# Patient Record
Sex: MALE | Race: WHITE | NOT HISPANIC OR LATINO | Employment: FULL TIME | URBAN - METROPOLITAN AREA
[De-identification: names, ages, dates, MRNs, and addresses within clinical notes are randomized per-mention and may not be internally consistent; named-entity substitution may affect disease eponyms.]

---

## 2017-09-13 ENCOUNTER — OFFICE VISIT (OUTPATIENT)
Dept: INTERNAL MEDICINE | Facility: CLINIC | Age: 32
End: 2017-09-13
Payer: COMMERCIAL

## 2017-09-13 VITALS
DIASTOLIC BLOOD PRESSURE: 74 MMHG | SYSTOLIC BLOOD PRESSURE: 116 MMHG | BODY MASS INDEX: 27.43 KG/M2 | HEART RATE: 68 BPM | OXYGEN SATURATION: 98 % | WEIGHT: 181 LBS | HEIGHT: 68 IN

## 2017-09-13 DIAGNOSIS — F90.0 ATTENTION DEFICIT HYPERACTIVITY DISORDER (ADHD), PREDOMINANTLY INATTENTIVE TYPE: ICD-10-CM

## 2017-09-13 DIAGNOSIS — Z00.00 WELLNESS EXAMINATION: Primary | ICD-10-CM

## 2017-09-13 PROCEDURE — 99395 PREV VISIT EST AGE 18-39: CPT | Mod: S$GLB,,, | Performed by: INTERNAL MEDICINE

## 2017-09-13 PROCEDURE — 99999 PR PBB SHADOW E&M-EST. PATIENT-LVL III: CPT | Mod: PBBFAC,,, | Performed by: INTERNAL MEDICINE

## 2017-09-13 RX ORDER — DEXTROAMPHETAMINE SACCHARATE, AMPHETAMINE ASPARTATE, DEXTROAMPHETAMINE SULFATE AND AMPHETAMINE SULFATE 2.5; 2.5; 2.5; 2.5 MG/1; MG/1; MG/1; MG/1
10 TABLET ORAL DAILY
Qty: 30 TABLET | Refills: 0 | Status: SHIPPED | OUTPATIENT
Start: 2017-09-13 | End: 2017-09-13 | Stop reason: SDUPTHER

## 2017-09-13 RX ORDER — DEXTROAMPHETAMINE SACCHARATE, AMPHETAMINE ASPARTATE, DEXTROAMPHETAMINE SULFATE AND AMPHETAMINE SULFATE 2.5; 2.5; 2.5; 2.5 MG/1; MG/1; MG/1; MG/1
10 TABLET ORAL DAILY
Qty: 30 TABLET | Refills: 0 | Status: SHIPPED | OUTPATIENT
Start: 2017-10-13 | End: 2017-09-13 | Stop reason: SDUPTHER

## 2017-09-13 RX ORDER — DEXTROAMPHETAMINE SACCHARATE, AMPHETAMINE ASPARTATE, DEXTROAMPHETAMINE SULFATE AND AMPHETAMINE SULFATE 2.5; 2.5; 2.5; 2.5 MG/1; MG/1; MG/1; MG/1
10 TABLET ORAL DAILY
Qty: 30 TABLET | Refills: 0 | Status: SHIPPED | OUTPATIENT
Start: 2017-11-12 | End: 2018-02-23 | Stop reason: SDUPTHER

## 2017-09-15 ENCOUNTER — LAB VISIT (OUTPATIENT)
Dept: LAB | Facility: OTHER | Age: 32
End: 2017-09-15
Attending: INTERNAL MEDICINE
Payer: COMMERCIAL

## 2017-09-15 DIAGNOSIS — Z00.00 WELLNESS EXAMINATION: ICD-10-CM

## 2017-09-15 LAB
ALBUMIN SERPL BCP-MCNC: 4.1 G/DL
ALP SERPL-CCNC: 54 U/L
ALT SERPL W/O P-5'-P-CCNC: 24 U/L
ANION GAP SERPL CALC-SCNC: 9 MMOL/L
AST SERPL-CCNC: 19 U/L
BASOPHILS # BLD AUTO: 0.02 K/UL
BASOPHILS NFR BLD: 0.4 %
BILIRUB SERPL-MCNC: 0.3 MG/DL
BUN SERPL-MCNC: 11 MG/DL
CALCIUM SERPL-MCNC: 9 MG/DL
CHLORIDE SERPL-SCNC: 104 MMOL/L
CHOLEST SERPL-MCNC: 185 MG/DL
CHOLEST/HDLC SERPL: 5.1 {RATIO}
CO2 SERPL-SCNC: 24 MMOL/L
CREAT SERPL-MCNC: 0.8 MG/DL
DIFFERENTIAL METHOD: ABNORMAL
EOSINOPHIL # BLD AUTO: 0.3 K/UL
EOSINOPHIL NFR BLD: 4.5 %
ERYTHROCYTE [DISTWIDTH] IN BLOOD BY AUTOMATED COUNT: 12.9 %
EST. GFR  (AFRICAN AMERICAN): >60 ML/MIN/1.73 M^2
EST. GFR  (NON AFRICAN AMERICAN): >60 ML/MIN/1.73 M^2
GLUCOSE SERPL-MCNC: 93 MG/DL
HCT VFR BLD AUTO: 40.3 %
HDLC SERPL-MCNC: 36 MG/DL
HDLC SERPL: 19.5 %
HGB BLD-MCNC: 13.5 G/DL
LDLC SERPL CALC-MCNC: 116.6 MG/DL
LYMPHOCYTES # BLD AUTO: 2.2 K/UL
LYMPHOCYTES NFR BLD: 39.9 %
MCH RBC QN AUTO: 29.5 PG
MCHC RBC AUTO-ENTMCNC: 33.5 G/DL
MCV RBC AUTO: 88 FL
MONOCYTES # BLD AUTO: 0.5 K/UL
MONOCYTES NFR BLD: 9 %
NEUTROPHILS # BLD AUTO: 2.6 K/UL
NEUTROPHILS NFR BLD: 46 %
NONHDLC SERPL-MCNC: 149 MG/DL
PLATELET # BLD AUTO: 174 K/UL
PMV BLD AUTO: 10.8 FL
POTASSIUM SERPL-SCNC: 3.9 MMOL/L
PROT SERPL-MCNC: 7.2 G/DL
RBC # BLD AUTO: 4.58 M/UL
SODIUM SERPL-SCNC: 137 MMOL/L
TRIGL SERPL-MCNC: 162 MG/DL
WBC # BLD AUTO: 5.57 K/UL

## 2017-09-15 PROCEDURE — 85025 COMPLETE CBC W/AUTO DIFF WBC: CPT

## 2017-09-15 PROCEDURE — 80061 LIPID PANEL: CPT

## 2017-09-15 PROCEDURE — 80053 COMPREHEN METABOLIC PANEL: CPT

## 2017-09-15 PROCEDURE — 36415 COLL VENOUS BLD VENIPUNCTURE: CPT

## 2017-10-13 ENCOUNTER — PATIENT MESSAGE (OUTPATIENT)
Dept: INTERNAL MEDICINE | Facility: CLINIC | Age: 32
End: 2017-10-13

## 2018-02-22 ENCOUNTER — PATIENT OUTREACH (OUTPATIENT)
Dept: INTERNAL MEDICINE | Facility: CLINIC | Age: 33
End: 2018-02-22

## 2018-02-22 NOTE — PROGRESS NOTES
[facility] is committed to your overall health.  To help you get the most out of each of your visits, we will review your information to make sure you are up to date on all of your recommended tests and/or procedures.       Your PCP  Ford Barrera MD   found that you may be due for:       Health Maintenance Due   Topic Date Due    Influenza Vaccine  08/01/2017             If you have had any of the above done at another facility, please bring the records or information with you so that your record at [facility] will be complete.  If you would like to schedule any of these, please contact me.     If you are currently taking medication, please bring it with you to your appointment for review.     Also, if you have any type of Advanced Directives, please bring them with you to your office visit so we may scan them into your chart.       Thank you for Choosing [facility] for your healthcare needs.        Additional Information  If you have questions, you can email [email or web address] or call [phone]  to talk to our [facility] staff. Remember, [facility] is NOT to be used for urgent needs. For medical emergencies, dial 911.

## 2018-02-23 ENCOUNTER — OFFICE VISIT (OUTPATIENT)
Dept: INTERNAL MEDICINE | Facility: CLINIC | Age: 33
End: 2018-02-23
Payer: COMMERCIAL

## 2018-02-23 VITALS
DIASTOLIC BLOOD PRESSURE: 80 MMHG | SYSTOLIC BLOOD PRESSURE: 120 MMHG | HEIGHT: 67 IN | BODY MASS INDEX: 29.31 KG/M2 | HEART RATE: 67 BPM | WEIGHT: 186.75 LBS

## 2018-02-23 DIAGNOSIS — F90.0 ATTENTION DEFICIT HYPERACTIVITY DISORDER (ADHD), PREDOMINANTLY INATTENTIVE TYPE: Primary | ICD-10-CM

## 2018-02-23 PROCEDURE — 3008F BODY MASS INDEX DOCD: CPT | Mod: S$GLB,,, | Performed by: INTERNAL MEDICINE

## 2018-02-23 PROCEDURE — 99999 PR PBB SHADOW E&M-EST. PATIENT-LVL III: CPT | Mod: PBBFAC,,, | Performed by: INTERNAL MEDICINE

## 2018-02-23 PROCEDURE — 99213 OFFICE O/P EST LOW 20 MIN: CPT | Mod: S$GLB,,, | Performed by: INTERNAL MEDICINE

## 2018-02-23 RX ORDER — DEXTROAMPHETAMINE SACCHARATE, AMPHETAMINE ASPARTATE, DEXTROAMPHETAMINE SULFATE AND AMPHETAMINE SULFATE 2.5; 2.5; 2.5; 2.5 MG/1; MG/1; MG/1; MG/1
10 TABLET ORAL DAILY
Qty: 30 TABLET | Refills: 0 | Status: SHIPPED | OUTPATIENT
Start: 2018-03-25 | End: 2022-01-11

## 2018-02-23 RX ORDER — DEXTROAMPHETAMINE SACCHARATE, AMPHETAMINE ASPARTATE, DEXTROAMPHETAMINE SULFATE AND AMPHETAMINE SULFATE 2.5; 2.5; 2.5; 2.5 MG/1; MG/1; MG/1; MG/1
10 TABLET ORAL DAILY
Qty: 30 TABLET | Refills: 0 | Status: SHIPPED | OUTPATIENT
Start: 2018-02-23 | End: 2022-01-11

## 2018-02-23 RX ORDER — DEXTROAMPHETAMINE SACCHARATE, AMPHETAMINE ASPARTATE, DEXTROAMPHETAMINE SULFATE AND AMPHETAMINE SULFATE 2.5; 2.5; 2.5; 2.5 MG/1; MG/1; MG/1; MG/1
10 TABLET ORAL DAILY
Qty: 30 TABLET | Refills: 0 | Status: SHIPPED | OUTPATIENT
Start: 2018-04-24 | End: 2022-01-11

## 2018-02-23 NOTE — PROGRESS NOTES
Subjective:       Patient ID: Nicanor Solis is a 32 y.o. male.    Chief Complaint: ADHD    Pt here for f/u ADHD. Doing well with adderall and not having side effects. Specifically denies cp/sob/palpitaitons/anorexia/wt loss/insomnia. LA  reviewed and is consistent. It is effective for his ADHD symptoms.         Review of Systems   Constitutional: Negative for activity change, appetite change and unexpected weight change.   HENT: Negative for hearing loss, rhinorrhea and trouble swallowing.    Eyes: Negative for discharge and visual disturbance.   Respiratory: Negative for chest tightness, shortness of breath and wheezing.    Cardiovascular: Negative for chest pain and palpitations.   Gastrointestinal: Negative for blood in stool, constipation, diarrhea and vomiting.   Endocrine: Negative for polydipsia and polyuria.   Genitourinary: Negative for difficulty urinating, hematuria and urgency.   Musculoskeletal: Positive for neck pain. Negative for arthralgias and joint swelling.   Neurological: Negative for weakness and headaches.   Psychiatric/Behavioral: Negative for confusion and dysphoric mood.       Objective:      Physical Exam   Constitutional: He is oriented to person, place, and time. He appears well-developed and well-nourished.   Neck: Neck supple. No thyromegaly present.   Cardiovascular: Normal rate, regular rhythm and normal heart sounds.    Pulmonary/Chest: Effort normal and breath sounds normal.   Lymphadenopathy:     He has no cervical adenopathy.   Neurological: He is alert and oriented to person, place, and time.   Psychiatric: He has a normal mood and affect. His behavior is normal.       Assessment:       1. Attention deficit hyperactivity disorder (ADHD), predominantly inattentive type        Plan:       1. Refill adderall--3 separate 30 days rxs for 90 days total; proper use d/w pt

## 2018-03-16 ENCOUNTER — TELEPHONE (OUTPATIENT)
Dept: SPORTS MEDICINE | Facility: CLINIC | Age: 33
End: 2018-03-16

## 2018-03-16 NOTE — TELEPHONE ENCOUNTER
I called the patient to inform him that Dr. Stewart does not see back pain and recommended calling the back and spine department. It sounded as the patient was in agony and when I asked he noted that he was on the floor not able to move. I recommended he go to the emergency department. He noted that he still wanted the phone number to back and spine but he didn't have a way to write it down. I offered to send it through the patient portal and he told me to just tell it to him and he would remember. I informed him that if he was not able to be seen sooner I would recommend he go to the emergency department with the amount of pain he seemed to be in.

## 2018-03-20 ENCOUNTER — OFFICE VISIT (OUTPATIENT)
Dept: INTERNAL MEDICINE | Facility: CLINIC | Age: 33
End: 2018-03-20
Attending: FAMILY MEDICINE
Payer: COMMERCIAL

## 2018-03-20 VITALS
HEART RATE: 56 BPM | BODY MASS INDEX: 28.44 KG/M2 | WEIGHT: 181.19 LBS | SYSTOLIC BLOOD PRESSURE: 128 MMHG | DIASTOLIC BLOOD PRESSURE: 72 MMHG | HEIGHT: 67 IN

## 2018-03-20 DIAGNOSIS — G89.29 CHRONIC LEFT SHOULDER PAIN: ICD-10-CM

## 2018-03-20 DIAGNOSIS — M54.50 CHRONIC MIDLINE LOW BACK PAIN WITHOUT SCIATICA: Primary | ICD-10-CM

## 2018-03-20 DIAGNOSIS — M25.512 CHRONIC LEFT SHOULDER PAIN: ICD-10-CM

## 2018-03-20 DIAGNOSIS — G89.29 CHRONIC MIDLINE LOW BACK PAIN WITHOUT SCIATICA: Primary | ICD-10-CM

## 2018-03-20 PROCEDURE — 99999 PR PBB SHADOW E&M-EST. PATIENT-LVL IV: CPT | Mod: PBBFAC,,, | Performed by: FAMILY MEDICINE

## 2018-03-20 PROCEDURE — 99214 OFFICE O/P EST MOD 30 MIN: CPT | Mod: S$GLB,,, | Performed by: FAMILY MEDICINE

## 2018-03-20 RX ORDER — NABUMETONE 500 MG/1
500 TABLET, FILM COATED ORAL 2 TIMES DAILY
Qty: 60 TABLET | Refills: 3 | Status: SHIPPED | OUTPATIENT
Start: 2018-03-20 | End: 2018-04-19

## 2018-03-20 RX ORDER — VALACYCLOVIR HYDROCHLORIDE 1 G/1
1000 TABLET, FILM COATED ORAL 2 TIMES DAILY
Qty: 10 TABLET | Refills: 0 | Status: SHIPPED | OUTPATIENT
Start: 2018-03-20 | End: 2022-01-11

## 2018-03-20 NOTE — PROGRESS NOTES
"CHIEF COMPLAINT:  Intermittent low back pain and chronic left shoulder pain    HISTORY OF PRESENT ILLNESS: The patient is a healthy 32 year-old WM.      He has almost a 10 year history of intermittent low back pain.  It is typically in the lower back in the midline without any radicular or myelopathic findings.  It typically resolves over a couple of days.  He's never needed any interventions or long-term medications.  I think he would benefit from are healthy back program.    The patient has a long history of decreased range of motion and pain of the left arm and shoulder. He was injured about 20 years ago on the same shoulder. He never got 100% better. We will have him see sports medicine when we get his back under control.     REVIEW OF SYSTEMS:  GENERAL: No fever, chills, fatigability or weight loss.  SKIN: No rashes, itching or changes in color or texture of skin.  HEAD: No headaches or recent head trauma.  EYES: Visual acuity fine. No photophobia, ocular pain or diplopia.  EARS: Denies ear pain, discharge or vertigo.  NOSE: No loss of smell, no epistaxis or postnasal drip.  MOUTH & THROAT: No hoarseness or change in voice. No excessive gum bleeding.  NODES: Denies swollen glands.  CHEST: Denies DAVID, cyanosis, wheezing, cough and sputum production.  CARDIOVASCULAR: Denies chest pain, PND, orthopnea or reduced exercise tolerance.  ABDOMEN: Appetite fine. No weight loss. Denies diarrhea, abdominal pain, hematemesis or blood in stool.  URINARY: No flank pain, dysuria or hematuria.  PERIPHERAL VASCULAR: No claudication or cyanosis.  MUSCULOSKELETAL: No joint stiffness or swelling. Except as noted above.  NEUROLOGIC: No history of seizures, paralysis, alteration of gait or coordination.    SOCIAL HISTORY: The patient does not smoke.  The patient consumes alcohol socially.  The patient is single.      PHYSICAL EXAMINATION:     Blood pressure 128/72, pulse (!) 56, height 5' 7" (1.702 m), weight 82.2 kg (181 lb 3.5 " oz).  APPEARANCE: Well nourished, well developed, in no acute distress.    HEAD: Normocephalic, atraumatic.  EYES: PERRL. EOMI.  Conjunctivae without injection and  anicteric  EARS: TM's intact. Light reflex normal. No retraction or perforation.    NOSE: Mucosa pink. Airway clear.  MOUTH & THROAT: No tonsillar enlargement. No pharyngeal erythema or exudate. No stridor.  NECK: Supple.   NODES: No cervical, axillary or inguinal lymph node enlargement.  CHEST: Lungs clear to auscultation.  No retractions are noted.  No rales or rhonchi are present.  CARDIOVASCULAR: Normal S1, S2. No rubs, murmurs or gallops.  ABDOMEN: Bowel sounds normal. Not distended. Soft. No tenderness or masses.  No ascites is noted.  MUSCULOSKELETAL:  There is no clubbing, cyanosis, or edema of the extremities x4.  There is full range of motion of the lumbar spine.  There is full range of motion of the extremities except for the left shoulder.  There is no deformity noted.    NEUROLOGIC:       Normal speech development.      Hearing normal.      Normal gait.      Motor and sensory exams grossly normal.      DTR's normal.  PSYCHIATRIC: Patient is alert and oriented x3.  Thought processes are all normal.  There is no homicidality.  There is no suicidality.  There is no evidence of psychosis.    LABORATORY/RADIOLOGY:   Chart reviewed.      ASSESSMENT:   Chronic intermittent low back pain  Decreased range of motion and pain of the left arm and shoulder    PLAN:  Healthy back referral  Relafen  Follow up with sports medicine     Follow-up with PCP  Answers for HPI/ROS submitted by the patient on 3/18/2018   Back pain  Chronicity: recurrent  Onset: more than 1 year ago  Frequency: intermittently  Progression since onset: gradually worsening  Pain location: lumbar spine, sacro-iliac  Pain quality: aching, shooting, stabbing  Radiates to: does not radiate  Pain - numeric: 9/10  Pain is: the same all the time  Aggravated by: bending, coughing, position,  lying down, sitting, standing, stress, twisting  Stiffness is present: in the morning, all day  abdominal pain: No  bladder incontinence: No  bowel incontinence: No  chest pain: No  dysuria: No  fever: No  headaches: No  leg pain: No  numbness: No  paresis: No  paresthesias: No  pelvic pain: No  perianal numbness: No  tingling: No  weakness: Yes  weight loss: No  genital pain: No  hematuria: No  Risk factors: lack of exercise, obesity, recent trauma  Pain severity: severe  Treatments tried: NSAIDs, bed rest, home exercises  Improvement on treatment: moderate

## 2018-03-26 ENCOUNTER — CLINICAL SUPPORT (OUTPATIENT)
Dept: REHABILITATION | Facility: OTHER | Age: 33
End: 2018-03-26
Attending: FAMILY MEDICINE
Payer: COMMERCIAL

## 2018-03-26 ENCOUNTER — CLINICAL SUPPORT (OUTPATIENT)
Dept: REHABILITATION | Facility: OTHER | Age: 33
End: 2018-03-26
Attending: PHYSICAL MEDICINE & REHABILITATION
Payer: COMMERCIAL

## 2018-03-26 VITALS
DIASTOLIC BLOOD PRESSURE: 84 MMHG | HEIGHT: 67 IN | BODY MASS INDEX: 28.91 KG/M2 | WEIGHT: 184.19 LBS | SYSTOLIC BLOOD PRESSURE: 123 MMHG | HEART RATE: 54 BPM

## 2018-03-26 DIAGNOSIS — G89.29 CHRONIC MIDLINE LOW BACK PAIN WITHOUT SCIATICA: Primary | ICD-10-CM

## 2018-03-26 DIAGNOSIS — M54.50 CHRONIC MIDLINE LOW BACK PAIN WITHOUT SCIATICA: Primary | ICD-10-CM

## 2018-03-26 PROCEDURE — 99204 OFFICE O/P NEW MOD 45 MIN: CPT | Mod: ,,, | Performed by: PHYSICAL MEDICINE & REHABILITATION

## 2018-03-26 NOTE — PROGRESS NOTES
Subjective:      Patient ID: Nicanor Solis is a 32 y.o. male.    Chief Complaint: No chief complaint on file.    Referred by: Chapito Long*     Mr Solis is a 31 yo male sent by Dr. Long for evaluation for the healthy back lumbar program.  He has had low back pain for 6 years, worse in the last 2 weeks.  He always shifts to the left when has a flare.  He was stretching on the floor 2 weeks ago and could not get up.  The pain has improved since then.  He has 3 flares a year with a week of pain.  He feels like the flares happen when he is tight from running and then bends.  He feels like it is hard to do sit ups.  The pain is middle low back dominant, and will go to both sides.  The pain is a stabbing sharp pain.  There is no leg pain.  He has weakness and burning in back.  There is no tingling or numbness.  The pain is intermittent ranging from 0-9/10.  It is worse with standing, sitting, lying on stomach, twisting and bending.  It is better with lying on his back, walking, stairs and ice and hot showers.  He has not been to PT or chiropractor.  He has not had injections or surgery. His goals are sitting, bending, and twisting. He does have a bad left shoulder injury.  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    Review of patient's family history indicates:    Hypertension                   Father                      Social History    Marital status: Single              Spouse name:                       Years of education:                 Number of children:               Social History Main Topics    Smoking status: Never Smoker                                                                Smokeless tobacco: Never Used                        Alcohol use: Yes           3.0 oz/week       Cans of beer: 5 per week    Drug use:  No              Sexual activity: Yes               Partners with: Female        Current Outpatient Prescriptions:  dextroamphetamine-amphetamine 10 mg Tab, Take 10 mg by mouth once daily., Disp: 30 tablet, Rfl: 0  dextroamphetamine-amphetamine 10 mg Tab, Take 10 mg by mouth once daily., Disp: 30 tablet, Rfl: 0  (START ON 4/24/2018) dextroamphetamine-amphetamine 10 mg Tab, Take 10 mg by mouth once daily., Disp: 30 tablet, Rfl: 0  nabumetone (RELAFEN) 500 MG tablet, Take 1 tablet (500 mg total) by mouth 2 (two) times daily., Disp: 60 tablet, Rfl: 3  valACYclovir (VALTREX) 1000 MG tablet, Take 1 tablet (1,000 mg total) by mouth 2 (two) times daily., Disp: 10 tablet, Rfl: 0    No current facility-administered medications for this visit.       Review of patient's allergies indicates:   -- Penicillins -- Other (See Comments)    --  Pt was testing at birth                Review of Systems   Constitution: Negative for weight gain and weight loss.   Cardiovascular: Negative for chest pain.   Respiratory: Negative for shortness of breath.    Musculoskeletal: Positive for back pain and joint pain (left shoulder). Negative for joint swelling.   Gastrointestinal: Negative for abdominal pain and bowel incontinence.   Genitourinary: Negative for bladder incontinence.   Neurological: Negative for numbness.           Objective:        General    Vitals reviewed.  Constitutional: He is oriented to person, place, and time. He appears well-developed and well-nourished.   HENT:   Head: Normocephalic and atraumatic.   Pulmonary/Chest: Effort normal.   Neurological: He is alert and oriented to person, place, and time.   Psychiatric: He has a normal mood and affect. His behavior is normal. Judgment and thought content normal.     General Musculoskeletal Exam   Gait: normal     Right Ankle/Foot Exam     Tests   Heel Walk: able to perform  Tiptoe Walk: able to perform    Left Ankle/Foot Exam     Tests   Heel Walk: able to perform  Tiptoe  Walk: able to perform  Back (L-Spine & T-Spine) / Neck (C-Spine) Exam     Back (L-Spine & T-Spine) Range of Motion   Extension: 20   Flexion: 80   Lateral Bend Right: 20   Lateral Bend Left: 20   Rotation Right: 40   Rotation Left: 40     Spinal Sensation   Right Side Sensation  C-Spine Level: normal   L-Spine Level: normal  S-Spine Level: normal  Left Side Sensation  C-Spine Level: normal  L-Spine Level: normal  S-Spine Level: normal    Back (L-Spine & T-Spine) Tests   Right Side Tests  Straight leg raise:      Sitting SLR: > 70 degrees      Left Side Tests  Straight leg raise:     Sitting SLR: > 70 degrees          Other He has no scoliosis .  Spinal Kyphosis:  Absent    Comments:  Neg ERIC  Posture sitting holds his shoulders back with lordosis at times        Muscle Strength   Right Upper Extremity   Biceps: 5/5/5   Deltoid:  5/5  Triceps:  5/5  Wrist Extension: 5/5/5   Finger Flexors:  5/5  Left Upper Extremity  Biceps: 5/5/5   Deltoid:  5/5  Triceps:  5/5  Wrist Extension: 5/5/5   Finger Flexors:  5/5  Right Lower Extremity   Hip Flexion: 5/5   Quadriceps:  5/5   Anterior tibial:  5/5/5  EHL:  5/5  Left Lower Extremity   Hip Flexion: 5/5   Quadriceps:  5/5   Anterior tibial:  5/5/5   EHL:  5/5    Reflexes     Left Side  Biceps:  2+  Triceps:  2+  Brachioradialis:  2+  Quadriceps:  2+  Achilles:  2+  Left Loera's Sign:  Absent  Babinski Sign:  absent    Right Side   Biceps:  2+  Triceps:  2+  Brachioradialis:  2+  Quadriceps:  2+  Achilles:  2+  Right Loera's Sign:  absent  Babinski Sign:  absent    Vascular Exam     Right Pulses        Carotid:                  2+    Left Pulses        Carotid:                  2+        Assessment:       Encounter Diagnosis   Name Primary?    Chronic midline low back pain without sciatica Yes         Plan:       Diagnoses and all orders for this visit:    Chronic midline low back pain without sciatica  -     Ambulatory consult to [facility]         The patient  has had a history of low back pain with limitations in there activities of Daily living    Previous treatment has not provided relief.    The situation was discussed at length with the patient.  More than 50% of the total time of 45 minutes was spent in counseling.   We discussed different causes of back pain and different treatment options.  We discussed the importance of stretching and strengthening.  We discussed posture.   He tries to be aware.  He also feels like he does things differently because of left shoulder injury.   We discussed the pros and cons of further diagnostic testing, alternative treatment and Medications    Based on the history, physical exam, and functional index, an active physical therapy program is recommended.  The goal is to restore the patients function and reduce pain.  A program of progressive resistance exercises, biomechanical, and mobility maneuvers, instructions in proper body mechanics, aerobic conditioning and HEP will be utilized. The program will continue as long as making improvements.    An assessment of patients progress will be made at each visit to document change in status.    The patient will be actively involved in there own treatment, and responsible for appointments and home program    The patient's lumbar isometric strength will be tested and they will be placed in a program of isolated strength training based on 50% of their total functional torque and advanced as clinically appropriate.      Directional preference of pain will further influence the patients active rehabilitation program    The patient was instructed there might be an initial increase in discomfort    They are enrolled with good prognosis  Pattern 1

## 2018-03-27 NOTE — PROGRESS NOTES
Health  met with patient to complete initial outcomes for the Healthy Back lumbar program.  Questions were reviewed with patient and discussed with Dr. Harvey. The patient will meet with Dr. Harvey to determine program enrollment.     HealthyBack Questionnaire  3/26/2018   Please select the location of your worst pain:  Low back   Please select the location of any additional pain: (hold down the control key, and click to select multiple responses) None of the above   Did any event trigger your pain?  Yes   If yes, please describe:  lifting a heavy bucket   How long has this pain been going on?  5-6 years but worse it the last two weeks   Please indicate how the pain is changing.  Worsening   What is the WORST level of pain that you have experienced in the last week?  9   What is the LEAST level of pain that you have experienced in the past week?  0   What can you NOT do not that you used to be able to do?  play basketball   Are your limitations mainly due to your pain?  Yes   What are your additional complaints, if any? Weakness   Is there ever a time during the day when your pain stops, even for a brief moment, before returning? Yes   If yes, when your pain stops, does it disappear completely? Is it totally gone? Yes   Does bending forward make your typical pain worse? Yes   Morning: Worse during   Afternoon: Same   Evening:  Better during   Nighttime: Better during   Standing:  Worse   Lying on stomach: Worse   Lying on back: Better   Sitting:  Worse   Walking: Better   Climbing stairs: Better   In the last seven days, have you had any changes in your bowel and/or bladder habits? Yes   Have all of your attempts to treat your back/leg pain resulted in failure?  Yes   Do you believe your doctor(s) do NOT understand how much pain you have?  No   Do you believe that you have one or more conditions that have not been diagnosed by your doctor(s)?  Yes   Do you believe that you deserve more understanding from  "others including your family than you are getting?  No   Do you feel relatively calm about how your back/leg pain has impacted your life?  Yes   Are you OK with treatment taking a very long time (even years) before you feel relief from your back/leg pain?  Yes   Do you believe that your pain has caused you to be more forgetful?  No   Do you feel that you have not received enough treatment for your pain?  No   Do you believe that your doctor(s) do not have the right training to treat your back/leg pain effectively?  Yes   Do you believe you should not be allowed to work or attend school because of your back/leg pain?  No   When did this pain begin?  5-6 years but worse in the last two weeks   Did the pain begin after an injury or an accident? Yes   If yes, please enter an approximate date. 5-6 years ago   Is the pain work related?  No   Please jason which of the following over-the-counter medicines you take. (hold down the control key, and click to select multiple responses) Other   If you chose "other," please specify:  Naproxen   Please jason which of the following prescription medicines you take. (hold down the control key, and click to select multiple responses) None   Emergency department  No   Health care providers (hold down the control key, and click to select multiple responses) Family doctor   Investigations done (hold down the control key, and click to select multiple responses) None   Procedures (hold down the control key, and click to select multiple responses) None   Emergency department  No   Health care providers (hold down the control key, and click to select multiple responses) Family doctor   Investigations done (hold down the control key, and click to select multiple responses) None   Procedures (hold down the control key, and click to select multiple responses) None   Have you had any surgery on your back?  No   Please jason what you are experiencing. (hold down the control key, and click to select " multiple responses) None   First activity you would like to perform better:  Bending foward   Current ability score to perform first activity: 5   Second activity you would like to perform better: Sitting   Current ability score to perform second activity: 4   Third activity you would like to perform better: Twisting    Current ability score to perform third activity: 4   Pain intensity:  The pain comes and goes and is very severe.   Personal care (washing, dressing, etc.):  Washing and dressing increase the pain, and I find it necessary to change my way of doing it.   Lifting: Pain prevents me from lifting heavy weights off the floor, but I can manage if conveniently positioned, e.g., a table.   Walking: I have some pain walking, but it does not increase with distance.   Sitting: Pain prevents me from sitting more than 1/2 hour.   Standing: Pain prevents me from standing more than 1/2 hour.   Sleeping: I get no pain in bed.   Social life: Pain has no significant effect on my social life apart from limiting my more eregetic interests, e.g., dancing.   Traveling: I get extra pain while traveling, but it does not compel me to seek alternative forms of travel.   Changing degree of pain: My pain is gradually worsening.   Do you need any help looking after yourself? I need no help at all.   When doing household tasks, e.g., preparing food, gardening, using the video recorder, radio, telephone, or washing the car: Occasionally I need some help with household tasks.   Thinking about how easily you can get around your home and community: I get around my home and community by myself without any difficulty.   Because of your health, your relationships, e.g., your friends, partner or parents, generally: Are very close and warm   Thinking about your relationships with other people: Although I have friends, I am occasionally lonely.   Thinking about your health and your relationship with your  family:  There are some parts of my  family role I cannot carry out.   Thinking about your vision, including when using your glasses or contact lenses if needed: I see normally.   Thinking about your hearing, including using your hearing aid if needed: I hear normally.   When you communicate with others, e.g., talking, listening, writing, or signing: I have no trouble speaking to them or understanding what they are saying.   Thinking about how you sleep: I am able to sleep without difficulty most of the time.   Thinking about how you generally feel: I am slightly anxious, worried or depressed.   How much pain or discomfort do you experience? I suffer from severe pain.   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 What are your hobbies? Surf and sailing   How do you spend your leisure time? What are your hobbies? Surf and sailing   What is your highest level of education? College   What is your work status? Employed   How did you hear about the Healthyback program?  Physician   When did this pain begin?  5-6 years but worse in the last two weeks   Do you need any help looking after yourself? I need no help at all.   When doing household tasks, e.g., preparing food, gardening, using the video recorder, radio, telephone, or washing the car: Occasionally I need some help with household tasks.   Thinking about how easily you can get around your home and community: I get around my home and community by myself without any difficulty.   Because of your health, your relationships, e.g., your friends, partner or parents, generally: Are very close and warm   Thinking about your relationships with other people: Although I have friends, I am occasionally lonely.   Thinking about your health and your relationship with your  family:  There are some parts of my family role I cannot carry out.   Thinking about your vision, including when  using your glasses or contact lenses if needed: I see normally.   Thinking about your hearing, including using your hearing aid if needed: I hear normally.   When you communicate with others, e.g., talking, listening, writing, or signing: I have no trouble speaking to them or understanding what they are saying.   Thinking about how you sleep: I am able to sleep without difficulty most of the time.   Thinking about how you generally feel: I am slightly anxious, worried or depressed.   How much pain or discomfort do you experience? I suffer from severe pain.   Little interest or pleasure in doing things Not at all   Feeling down, depressed or hopeless Not at all

## 2018-04-12 ENCOUNTER — CLINICAL SUPPORT (OUTPATIENT)
Dept: REHABILITATION | Facility: OTHER | Age: 33
End: 2018-04-12
Attending: PHYSICAL MEDICINE & REHABILITATION
Payer: COMMERCIAL

## 2018-04-12 DIAGNOSIS — G89.29 CHRONIC BILATERAL LOW BACK PAIN WITHOUT SCIATICA: ICD-10-CM

## 2018-04-12 DIAGNOSIS — M54.50 CHRONIC BILATERAL LOW BACK PAIN WITHOUT SCIATICA: ICD-10-CM

## 2018-04-12 PROCEDURE — 97162 PT EVAL MOD COMPLEX 30 MIN: CPT | Performed by: PHYSICAL MEDICINE & REHABILITATION

## 2018-04-12 PROCEDURE — 97110 THERAPEUTIC EXERCISES: CPT | Performed by: PHYSICAL MEDICINE & REHABILITATION

## 2018-04-12 NOTE — PLAN OF CARE
[facility] - PHYSICAL THERAPY EVALUATION     Name: Nicanor Solis  Clinic Number: [id number]    Nicanor is a 32 y.o. male evaluated on 04/12/2018.   Time In: 8:30 am  Time out: 10:00 am    Diagnosis:   Encounter Diagnosis   Name Primary?    Chronic bilateral low back pain without sciatica      Physician: Eliza Harvey, *  Treatment Orders: PT Eval and Treat    Past Medical History:   Diagnosis Date    ADHD (attention deficit hyperactivity disorder)      Current Outpatient Prescriptions   Medication Sig    dextroamphetamine-amphetamine 10 mg Tab Take 10 mg by mouth once daily.    dextroamphetamine-amphetamine 10 mg Tab Take 10 mg by mouth once daily.    [START ON 4/24/2018] dextroamphetamine-amphetamine 10 mg Tab Take 10 mg by mouth once daily.    nabumetone (RELAFEN) 500 MG tablet Take 1 tablet (500 mg total) by mouth 2 (two) times daily.    valACYclovir (VALTREX) 1000 MG tablet Take 1 tablet (1,000 mg total) by mouth 2 (two) times daily.     No current facility-administered medications for this visit.      Review of patient's allergies indicates:   Allergen Reactions    Penicillins Other (See Comments)     Pt was testing at birth       Precautions:  Left shoulder injury year ago with current limited mobility       Visit # authorized: 20, program patient  Authorization period: 20 through 4/12/2019  Plan of care Expiration: sent 4/12/18    PATTERN: 1 PEP    Date of eval:  4/12/18  Assessment due:  5/12/18      HISTORY   History of Present Illness: Mr Solis is a 33 yo male sent by Dr. Long for evaluation for the healthy back lumbar program.  He has had low back pain for 6 years, worse in the last 2 weeks.  He always shifts to the left when has a flare.    He always has some awareness of back pain, but a few times a year his back goes out and he is shifted for a few days.   When he is shifted, it usually lasts for 2 weeks.  He immediately stops eating, he fasts.  He feels he has inflammation  from sugars.  Then he keeps moving and his back gets better. His last episode was 1 month ago when he was shifted. The pain has improved since then.  He has 3 flares a year with a week of pain.  He feels like the flares happen when he is tight from running and then bends, or he works in forward position.    He feels like it is hard to do sit ups.  The pain is middle low back dominant, and will go to both sides.  The pain is a stabbing sharp pain.  There is no leg pain.  He has weakness and burning in back.  There is no tingling or numbness.  The pain is intermittent ranging from 0-9/10.  It is worse with standing, sitting, lying on stomach, twisting and bending.  It is better with lying on his back, walking, stairs and ice and hot showers.  He has not been to PT or chiropractor.  He has not had injections or surgery. His goals are sitting, bending, and twisting. He does have a bad left shoulder injury.  Pattern 1         Aggravating factors: bending, running and then bending, sitting  Easing Factors: moving, walking  Disturbed Sleep: not now      Pain Scale: Nicanor rates pain on a scale of 0-10 to be 8 at worst; 0 currently; 1 at best using VAS.   Pain location: back         Pattern of pain questions:  1.  Where is your pain the worst? back  2.  Is your pain constant or intermittent? intermittent  3.  Does bending forward make your typical pain worse? yes  4.  Since the start of your back pain, has there been a change in your bowel or bladder? no  5.  What can't you do now that you use to be able to do? 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, and a student the rest of the time  Leisure: he jumped rope for 5 min yesterday, sometimes he runs, he wants to join a gym, he wants to box  , he doesn't exercise regularly and then he does activities and pushes too hard                   Pts goals:  Run, and be  consistent with exercise    Red Flag Screening:   Cough  Sneeze  Strain: neg  Bladder/ bowel: neg  Falls: no  General health: good  Night pain: neg  Unexplained weight loss: neg    OBJECTIVE     POSTURE  Posture Alignment :poor posture, sits slouched with head forward  Postural examination/scapula alignment:rounded shoulders, head forward  Standing: poor, flattened lordosis  Correction of posture: better with lumbar roll    MOVEMENT LOSS    ROM Loss   Flexion moderate loss   Extension moderate loss   Side bending Right moderate loss   Side bending Left moderate loss   Rotation Right moderate loss   Rotation Left moderate loss     Lower Extremity Strength  Right LE  Left LE    Hip flexion: 5/5 Hip flexion: 5/5   Hip extension:  5/5 Hip extension: 5/5   Hip abduction: 5/5 Hip abduction: 5/5   Knee Flexion 5/5 Knee Flexion 5/5   Knee Extension 5/5 Knee Extension 5/5   Ankle dorsiflexion: 5/5 Ankle dorsiflexion: 5/5   Ankle plantarflexion: 5/5 Ankle plantarflexion: 5/5     Squat with heels down, and return    Tight hips bilat, tight luis, tight hamstrings, tight piriformis  GAIT:  amb without aid      Special Tests:   Test Name  Test Result   Prone Instability Test (--)   SI Joint Provocation Test (--)   Straight Leg Raise (--)   Neural Tension Test (--)   Crossed Straight Leg Raise (--)   Walking on toes able   Walking on heels  able       NEUROLOGICAL SCREENING     Sensory deficit: intact including saddle sensation    Reflexes:    Left Right   Patella Tendon 1+ 1+   Achilles Tendon 1+ 1+   Babinski  (--) (--)   Clonus (--) (--)     REPEATED TEST MOVEMENTS:  Back pain 1/10  Reduced left side glide  Flexion in standing,stretched back, noted left aberrant movement, no worse  Ext in standing, pain better and left side glide improved and aberrant movements abolished  Flexion in lie, better, felt great, no loss of motion and  No shift or aberrant movement after  Ext in lie felt great, no aberrant movement and no  shift    Baseline Isometric Testing on Med X equipment: Testing administered by PT    [facility] 2018   Visit Number 1   VAS Pain Rating 2   Treadmill Time (in min.) 10   Speed 3   Extension in Lying 10   Extension in Standing 10   Flexion in Lying 10   Lumbar Extension Seat Pad 0   Femur Restraint 5   Top Dead Center 24   Counterweight 215   Lumbar Flexion 36   Lumbar Extension 0   Lumbar Peak Torque 126   Min Torque 49   Percent From Norm -64   Ice - Z Lie (in min.) 10         Baseline IM Testing Results:   Date of testin18  ROM 0-36 deg   Max Peak Torque 126 ft lbs    Min Peak Torque 49 ft lbs    Flex/Ext Ratio 2.5/1   % below normative data -64%   Counter weight 215   Femur number 5   Seat pad 0           FOTO: Focus on Therapeutic Outcomes   Fear avoidance 41  Intake  28% Current Status CJ - At least 20 percent but less than 40 percent  Predicted< 20 % Goal Status+ CI -     Score interpretation is as follows:     TEST SCORE  Modifier  Impairment Limitation Restriction    0/50  CH  0 % impaired, limited or restricted   1-9/50  CI  @ least 1% but less than 20% impaired, limited or restricted   10-19/50  CJ  @ least 20%<40% impaired, limited or restricted   20-29/50  CK  @ least 40%<60% impaired, limited or restricted   30-39/50  CL  @ least 60% <80% impaired, limited or restricted   40-49/50  CM  @ least 80%<100% impaired limited or restricted   50/50  CN  100% impaired, limited or restricted       Treatment   Time In: 9:30 am  Time Out: 10:00 am    PT Evaluation Completed? Yes  Discussed Plan of Care with patient: Yes      Written Home Exercises Provided:   Handouts were given to the patient. Pt demo good understanding of the education provided. Nicanor demonstrated good return demonstration of activities.        Pt was instructed in and performed the following:   Cardiovascular exercise and therapeutic exercise to improve posture, lumbar/cervical ROM, strength, and muscular endurance as  "follows:     Nicanor received therapeutic exercises to develop/improve posture, lumbar/cervical ROM, strength and muscular endurance for 30 minutes including the following exercises:   Med ex warm up  Med ex testing  Education regarding posture  HEP performed      HEP started as follows:  -handouts given regarding back care, with information regarding posture, body mech, ergonomics, and components of good exercise program  -Patient received education regarding proper posture and body mechanics.  Lashay roll tried, recommended, and purchase information was provided.  -discussed concept of developing "tool box" or "strategies, using positions or exercises to reduce symptoms.  Discussed using these tools to reduce symptoms, and also to prevent symptoms if able.      --gave top 10 tips handouts on back and neck care and discusses sitting posture, use of lumbar roll, standing  Posture, correct lifting techniques, need to exercise and encouraged walking, drinking water, healthy diet, regular sleep    HEP as follows:   walk 3/week  Ext in standing during day at work 10 reps 3/day  Ext in lie at home 2/day 10 reps  Flexion in lie 2/day 10 reps  Use lumbar roll  Plan:  Address tight hip musculature        - Patient received a handout regarding anticipated muscular soreness following the isometric test and strategies for management were reviewed with patient including stretching, using ice and scheduled rest.         Assessment   This is a 32 y.o. male referred to [facility] and presents with a medical diagnosis of   Encounter Diagnosis   Name Primary?    Chronic bilateral low back pain without sciatica     and demonstrates limitations as described below in the problem list. Pt rehab potential is Good. Pt presents with back pain, reduced back strength and reduced back mobility, tight hip musculature, difficulty paying attention to instructions, pattern of exercising without consistency like jumping rope or " running and then injuring himself. History of shifting to the left when back goes out    Pain Pattern: 1 PEP, tight hip musculature       Patient received education on the Healthy Back program, purpose of the isometric test, progression of back strengthening as well as wellness approach and systemic strengthening.  Details of the program were discussed.  Reviewed that patient should feel support/pressure from med ex restraints but no pain or discomfort and patient expressed understanding.    Based on the above history and physical examination an active physical therapy program is recommended.  Pt will continue to benefit from skilled outpatient physical therapy to address the deficits listed below in the chart, provide pt/family education and to maximize pt's level of independence in the home and community environment. .     No environmental, cultural, spiritual, developmental or education needs expressed or noted    Medical necessity is demonstrated by the following problem list.    Pt presents with the following impairments:   History  Co-morbidities and personal factors that may impact the plan of care Examination  Body Structures and Functions, activity limitations and participation restrictions that may impact the plan of care Clinical Presentation   Decision Making/ Complexity Score   Co-morbidities:     Left shoulder issues      Personal Factors:   Sits all day    ADHD and some difficulty staying focused    Habit of exercising inconsistently and without good practice Body Regions:   back  lower extremities    Body Systems:   gross symmetry  ROM  strength  gross coordinated movement  transitions  motor control  motor learning    Activity limitations:   Learning and applying knowledge  no deficits    General Tasks and Commands  no deficits    Communication  no deficits    Mobility  using transportation (bus, train, plane, car)    Self care  looking after one's health    Domestic Life  doing house work (cleaning  house, washing dishes, laundry)    Interactions/Relationships  no deficits    Life Areas  no deficits    Community and Social Life  no deficits    Participation Restrictions:   Going to gym     evolving clinical presentation with changing clinical characteristics   mod         GOALS: Pt is in agreement with the following goals.    Short term goals:  6 weeks or 10 visits   1.  Pt will demonstrate increased lumbar ROM by at least 3 degrees from the initial ROM value with improvements noted in functional ROM and ability to perform ADLs  2.  Pt will demonstrate increased improved lumbar strength on testing by 10 % with improvements functionally noted with standing/walking/lifting groceries, self care  3.  Patient report a reduction in worst pain score by 1-2 points for improved tolerance during work and recreational activities  4.  Pt able to perform HEP correctly with minimal cueing or supervision for therapist      Long term goals: 13 weeks or 20 visits   1. Pt will demonstrate increased lumbar ROM by at least 12 degrees from initial ROM value, resulting in improved ability to perform functional fwd bending while standing and sitting.   2. Pt will demonstrate increased maximum isometric torque value by 25% when compared to the initial value resulting in improved ability to perform bending, lifting, and carrying activities safely, confidently.  3. Pt to demonstrate ability to independently control and reduce their pain through posture positioning and mechanical movements throughout a typical day.  4.  Patient will demonstrate improved overall function per FOTO Survey to CI = at least 1% but < 20% impaired, limited or restricted score or less.  5. Return to regular exercise at gym safely  6. No shifts of trunk or episodes of shifting  7. Sit with good posture  8. Good body mech with carpentry and household projects      Plan   Outpatient physical therapy 2x week for 13 weeks or 20 visits to include the following:   -  "Patient education  - Therapeutic exercise  - Manual therapy  - Performance testing   - Neuromuscular Re-education  - Therapeutic activity   - Modalities    Pt may be seen by PTA as part of the rehabilitation team.     Therapist: Carmen Hernandez, PT  4/12/2018    "I certify the need for these services furnished under this plan of treatment and while under my care."    ____________________________________  Physician/Referring Practitioner    _______________  Date of Signature          "

## 2018-04-12 NOTE — PATIENT INSTRUCTIONS
Top 10 tips for back and neck pain    The spine is the pillar of the body, providing the foundation for the upper and lower extremities to attach.  Our spines withstand significant forces all day long.  There are many ways in which we can take care of our backs.  Here are a couple tips to help you keep your back in action.    1. Watch your posture in sitting.     Sit in chairs with back supports, and use a lumbar roll to maintain the normal curve of your low back. Ensure the height of your chair is such that your feet rest flat on the floor with your knees and hips level.  The average American sits 9 hours a day.  Do not sit longer than 1 hour without getting up to stretch or move.     2. Watch your posture in standing.   Maintain the normal curves of your spine in standing.   When standing tall, you should be able to draw a line down through your ear, shoulder, hip, and ankle.  Wear good shoes and consider using a standing desk mat if you stand a lot during work.  Take breaks from standing.    3. Lift correctly   Lift objects by using the strong muscles in your legs.  Get close to the object, bend your knees and your hips, and maintain the normal curve of your low back. Do not twist when lifting or carrying items. Think about the tasks you perform daily at work or home, and minimize lifting and carrying objects.  Use rolling carts or other strategies to reduce back strain.    4. Exercise regularly  Individuals who exercise regularly generally experience better health, reduced back pain, and less stress.  A good exercise program has a stretching component, a strengthening component, and an aerobic component.   Maintain the mobility of your spine by stretching daily. Strengthen your core and extremities several times a week.   Get regular cardiovascular exercise, 3-5/week.  Choose activities you like such as walking, swimming, dancing, or riding a bike.     5. Quit Smoking  Smoking increases the likelihood of back  pain.  It is thought that smoking reduces the blood supply to the discs between the vertebrae and this may lead to degeneration of these discs.  Talk to your Physician about quitting.  There are many smoking cessation options that may work for you.    6. Keep moving even when you have pain  Motion is lotion.   The majority of back pain is mechanical in nature, and will likely reduce with gentle movements, stretching, and walking.  As tempting as it may be to stay in bed when you are hurting, remember that you will likely feel better by getting up and gently moving and walking.  Limit bed rest.      7. Maintain a healthy diet  Try to maintain a healthy diet and weight.        8. Stay hydrated  The average adult is approximately 60 % water.  Staying hydrated is beneficial for all aspects of health.  In general, an adult should drink half of their body weight in ounces.  For example, if you weight 180 lbs, you should drink 90 ounces of water daily.     9. Get regular sleep   Ensure that you get a good nights rest on a regular basis. The discs in your spine hydrate when you lie down to sleep. Your spine needs the rest too.     10. See Your Physician    Make an appointment to see your Physician for back pain that is progressively worsening, and for back pain that is no better or worse with changing positions and activities.        Z LIE POSITION  Z Lie is a position that you can use to unload your back and assist with pain reduction.  Lie on your back and rest your calves on the seat on a chair or a bench.  Viewed from the side, you should resemble a Z.  Your therapist may suggest sliding the chair closer to you, so your knees are over your stomach.   Your therapist may also suggest a pillow under your buttock if needed.  Follow the directions from your therapist.  The goal of this position is to reduce your symptoms.    Z lie can be done in a variety of ways.  It can be done on a bed resting your legs on a light  and easy to lift chair              Goal:  - reduce back /thigh/buttock pain with mechanical movements or positions that relieve pressure on structures in low back  - exercises done to reduce symptoms when you experience them  - exercises done to prevent symptoms, when you are feeling good      1. Exercises  A. Supine Knee-to-Chest, Bilateral    Lie on back, hands clasped behind both knees. Pull knees in toward chest until a comfortable stretch is felt in lower back and buttocks. Hold 3 seconds.  Repeat 10 times per session. Do 2 sessions per day.      Backward Bend (Standing)  DO AT WORK        Arch backward to make hollow of back deeper. Hold __3__ seconds.  Repeat _10___ times per set. Do _1___ sets per session. Do ___3_ sessions per day.     https://[employer]/178     Copyright © [employer]. All rights reserved.   Press-Up        Press upper body upward, keeping hips in contact with floor. Keep lower back and buttocks relaxed. Hold ___2_ seconds.  Repeat _10___ times per set. Do __1__ sets per session. Do _2___ sessions per day.     [email or web address]://[employer]/94     Copyright © [employer]. All rights reserved.             Do this exercise on your hands and knees. Keep your knees under your hips and your hands under your shoulders.

## 2018-04-16 ENCOUNTER — CLINICAL SUPPORT (OUTPATIENT)
Dept: REHABILITATION | Facility: OTHER | Age: 33
End: 2018-04-16
Attending: PHYSICAL MEDICINE & REHABILITATION
Payer: COMMERCIAL

## 2018-04-16 DIAGNOSIS — G89.29 CHRONIC BILATERAL LOW BACK PAIN WITHOUT SCIATICA: ICD-10-CM

## 2018-04-16 DIAGNOSIS — M54.50 CHRONIC BILATERAL LOW BACK PAIN WITHOUT SCIATICA: ICD-10-CM

## 2018-04-16 PROCEDURE — 97110 THERAPEUTIC EXERCISES: CPT

## 2018-04-16 NOTE — PROGRESS NOTES
[facility]      Name: Nicanor Solis  Clinic Number: [id number]  Date of Treatment: 2018   Diagnosis:   Encounter Diagnosis   Name Primary?    Chronic bilateral low back pain without sciatica      Physician: Eliza Harvey, *    Pain pattern determined: 1 PEP  Plan of care signed: 18   Time in: 8:00  Time Out: 9:00  Total Treatment time: 50  Precautions: Left shoulder injury year ago with current limited mobility  Visit #: 2    POC due:18  Reassessment due:18    Face to Face discussion of patient was done between PT and PTA.     Subjective   Nicanor reports having no LBP today. He gets pain in the am.  He feels the the EIS is awkward for him.     Patient reports their pain to be 0/10 on a 0-10 scale with 0 being no pain and 10 being the worst pain imaginable.    Pain Location: LB     Work and leisure:  he jumped rope for 5 min yesterday, sometimes he runs, he wants to join a gym, he wants to box  , he doesn't exercise regularly and then he does activities and pushes too hard                   Pts goals:  Run, and be consistent with exercise      Objective   Date of testin18  ROM 0-36 deg   Max Peak Torque 126 ft lbs    Min Peak Torque 49 ft lbs    Flex/Ext Ratio 2.5/1   % below normative data -64%   Counter weight 215   Femur number 5   Seat pad 0               FOTO: Focus on Therapeutic Outcomes   Fear avoidance 41  Intake  28% Current Status CJ - At least 20 percent but less than 40 percent  Predicted< 20 % Goal Status+ CI -       Treatment    Pt was instructed in and performed the following:     iNcanor received therapeutic exercises to develop/improved posture, cardiovascular endurance, muscular endurance, lumbar/cervical ROM, strength and muscular endurance for 50 minutes including the following exercises:     [facility] 2018   Visit Number 2   VAS Pain Rating 0   Treadmill Time (in min.) 10   Speed 3   Extension in Lying 10    Extension in Standing 10   Flexion in Lying 10   Lumbar Extension Seat Pad -   Femur Restraint -   Top Dead Center -   Counterweight -   Lumbar Flexion -   Lumbar Extension -   Lumbar Peak Torque -   Min Torque -   Percent From Norm -   Lumbar Weight 62   Repetitions 20   Rating of Perceived Exertion 3   Ice - Z Lie (in min.) 10       Peripheral muscle strengthening which included 1 set of 15-20 repetitions at a slow, controlled 7 second per rep pace focused on strengthening supporting musculature for improved body mechanics and functional mobility.  Pt and therapist focused on proper form during treatment to ensure optimal strengthening of each targeted muscle group.  Machines were utilized including torso rotation, leg extension, leg curl, chest press, upright row. Tricep extension, bicep curl, leg press, and hip abduction added on third visit.       Nicanor received the following manual therapy techniques: n/a were applied to the: n/a for 0 minutes.       Home Exercise Program as follows:    walk 3/week  Ext in standing during day at work 10 reps 3/day  Ext in lie at home 2/day 10 reps  Flexion in lie 2/day 10 reps  Use lumbar roll  Plan:  Address tight hip musculature     Handouts were given to the patient. Pt demo good understanding of the education provided. Nicanor demonstrated good return demonstration of activities.     Lumbar roll use compliance: unknown  Additional exercises taught this treatment session: none    Assessment     Pt with no LBP today or during session.  Reviewed HEP with mod vc for tech. Reviewed EIS with max vc due to pt wanting to extend midback instead of LB. Stretch a little uncomfortable, but no pain. He was a little tight after, so he did DANNY which helped. Review.  Pt tolerated lumbar medx machine with starting weight 50% of pts max peak torque with no c/o pain. Pt tolerated the medx machines well to the upright row with no c/o increased LB pain or any limb pain.      Patient is  making good progress towards established goals.  Pt will continue to benefit from skilled outpatient physical therapy to address the deficits stated in the impairment chart, provide pt/family education and to maximize pt's level of independence in the home and community environment.       Pt's spiritual, cultural and educational needs considered and pt agreeable to plan of care and goals as stated below:     Medical necessity is demonstrated by the following IMPAIRMENTS/PROBLEMS:        Plan   Continue with established Plan of Care towards established PT goals.   History  Co-morbidities and personal factors that may impact the plan of care Examination  Body Structures and Functions, activity limitations and participation restrictions that may impact the plan of care Clinical Presentation    Decision Making/ Complexity Score   Co-morbidities:      Left shoulder issues        Personal Factors:   Sits all day     ADHD and some difficulty staying focused     Habit of exercising inconsistently and without good practice Body Regions:   back  lower extremities     Body Systems:   gross symmetry  ROM  strength  gross coordinated movement  transitions  motor control  motor learning     Activity limitations:   Learning and applying knowledge  no deficits     General Tasks and Commands  no deficits     Communication  no deficits     Mobility  using transportation (bus, train, plane, car)     Self care  looking after one's health     Domestic Life  doing house work (cleaning house, washing dishes, laundry)     Interactions/Relationships  no deficits     Life Areas  no deficits     Community and Social Life  no deficits     Participation Restrictions:   Going to gym       evolving clinical presentation with changing clinical characteristics    mod            GOALS: Pt is in agreement with the following goals.     Short term goals:  6 weeks or 10 visits   1.  Pt will demonstrate increased lumbar ROM by at least 3 degrees from the  initial ROM value with improvements noted in functional ROM and ability to perform ADLs  2.  Pt will demonstrate increased improved lumbar strength on testing by 10 % with improvements functionally noted with standing/walking/lifting groceries, self care  3.  Patient report a reduction in worst pain score by 1-2 points for improved tolerance during work and recreational activities  4.  Pt able to perform HEP correctly with minimal cueing or supervision for therapist        Long term goals: 13 weeks or 20 visits   1. Pt will demonstrate increased lumbar ROM by at least 12 degrees from initial ROM value, resulting in improved ability to perform functional fwd bending while standing and sitting.   2. Pt will demonstrate increased maximum isometric torque value by 25% when compared to the initial value resulting in improved ability to perform bending, lifting, and carrying activities safely, confidently.  3. Pt to demonstrate ability to independently control and reduce their pain through posture positioning and mechanical movements throughout a typical day.  4.  Patient will demonstrate improved overall function per FOTO Survey to CI = at least 1% but < 20% impaired, limited or restricted score or less.  5. Return to regular exercise at gym safely  6. No shifts of trunk or episodes of shifting  7. Sit with good posture  8. Good body mech with carpentry and household projects        Plan   Outpatient physical therapy 2x week for 13 weeks or 20 visits to include the following:   - Patient education  - Therapeutic exercise  - Manual therapy  - Performance testing   - Neuromuscular Re-education  - Therapeutic activity   - Modalities     Pt may be seen by PTA as part of the rehabilitation team.      Therapist: Carmen Hernandez, PT  4/12/2018

## 2018-04-25 ENCOUNTER — CLINICAL SUPPORT (OUTPATIENT)
Dept: REHABILITATION | Facility: OTHER | Age: 33
End: 2018-04-25
Attending: PHYSICAL MEDICINE & REHABILITATION
Payer: COMMERCIAL

## 2018-04-25 DIAGNOSIS — G89.29 CHRONIC BILATERAL LOW BACK PAIN WITHOUT SCIATICA: ICD-10-CM

## 2018-04-25 DIAGNOSIS — M54.50 CHRONIC BILATERAL LOW BACK PAIN WITHOUT SCIATICA: ICD-10-CM

## 2018-04-25 PROCEDURE — 97110 THERAPEUTIC EXERCISES: CPT

## 2018-04-25 NOTE — PROGRESS NOTES
[facility]      Name: Nicanor Solis  Clinic Number: [id number]  Date of Treatment: 2018   Diagnosis:   Encounter Diagnosis   Name Primary?    Chronic bilateral low back pain without sciatica      Physician: Eliza Harvey, *    Pain pattern determined: 1 PEP  Plan of care signed: 18   Time in: 8:30  Time Out: 9:30  Total Treatment time: 60  Precautions: Left shoulder injury year ago with current limited mobility  Visit #: 3    POC due:18  Reassessment due:18    Face to Face discussion of patient was done between PT and PTA.     Subjective   Nicanor reports having no LBP today. Pt states he isnt stretching as much as he should    Patient reports their pain to be 0/10 on a 0-10 scale with 0 being no pain and 10 being the worst pain imaginable.    Pain Location: LB     Work and leisure:  he jumped rope for 5 min yesterday, sometimes he runs, he wants to join a gym, he wants to box  , he doesn't exercise regularly and then he does activities and pushes too hard                   Pts goals:  Run, and be consistent with exercise      Objective   Date of testin18  ROM 0-36 deg   Max Peak Torque 126 ft lbs    Min Peak Torque 49 ft lbs    Flex/Ext Ratio 2.5/1   % below normative data -64%   Counter weight 215   Femur number 5   Seat pad 0               FOTO: Focus on Therapeutic Outcomes   Fear avoidance 41  Intake  28% Current Status CJ - At least 20 percent but less than 40 percent  Predicted< 20 % Goal Status+ CI -       Treatment    Pt was instructed in and performed the following:     Nicanor received therapeutic exercises to develop/improved posture, cardiovascular endurance, muscular endurance, lumbar/cervical ROM, strength and muscular endurance for 40 minutes including the following exercises:   [facility] 2018   Visit Number 3   VAS Pain Rating 0   Treadmill Time (in min.) 10   Speed 3   Extension in Lying 10   Extension in  Standing 10   Flexion in Lying 10   Lumbar Extension Seat Pad -   Femur Restraint -   Top Dead Center -   Counterweight -   Lumbar Flexion -   Lumbar Extension -   Lumbar Peak Torque -   Min Torque -   Percent From Norm -   Lumbar Weight 68   Repetitions 20   Rating of Perceived Exertion 4   Ice - Z Lie (in min.) 10       Open book 10x  HS stretch c/ strap  Peripheral muscle strengthening which included 1 set of 15-20 repetitions at a slow, controlled 7 second per rep pace focused on strengthening supporting musculature for improved body mechanics and functional mobility.  Pt and therapist focused on proper form during treatment to ensure optimal strengthening of each targeted muscle group.  Machines were utilized including torso rotation, leg extension, leg curl, chest press, upright row. Tricep extension, bicep curl, leg press, and hip abduction added on third visit.       Nicanor received the following manual therapy techniques: n/a were applied to the: n/a for 0 minutes.       Home Exercise Program as follows:    walk 3/week  Ext in standing during day at work 10 reps 3/day  Ext in lie at home 2/day 10 reps  Flexion in lie 2/day 10 reps  Use lumbar roll  Plan:  Address tight hip musculature     Handouts were given to the patient. Pt demo good understanding of the education provided. Nicanor demonstrated good return demonstration of activities.     Lumbar roll use compliance: unknown  Additional exercises taught this treatment session: added OB at home    Assessment   Pt tolerated treatment well and was able to complete 20 reps at increase weight of 10% with exertion of 4/10. Added open books for HEP.  Pt able to perform prone extension without difficulty, however still reports difficulty with standing extension.  Patient is making good progress towards established goals.  Pt will continue to benefit from skilled outpatient physical therapy to address the deficits stated in the impairment chart, provide pt/family  education and to maximize pt's level of independence in the home and community environment.       Pt's spiritual, cultural and educational needs considered and pt agreeable to plan of care and goals as stated below:     Medical necessity is demonstrated by the following IMPAIRMENTS/PROBLEMS:        Plan   Continue with established Plan of Care towards established PT goals.   History  Co-morbidities and personal factors that may impact the plan of care Examination  Body Structures and Functions, activity limitations and participation restrictions that may impact the plan of care Clinical Presentation    Decision Making/ Complexity Score   Co-morbidities:      Left shoulder issues        Personal Factors:   Sits all day     ADHD and some difficulty staying focused     Habit of exercising inconsistently and without good practice Body Regions:   back  lower extremities     Body Systems:   gross symmetry  ROM  strength  gross coordinated movement  transitions  motor control  motor learning     Activity limitations:   Learning and applying knowledge  no deficits     General Tasks and Commands  no deficits     Communication  no deficits     Mobility  using transportation (bus, train, plane, car)     Self care  looking after one's health     Domestic Life  doing house work (cleaning house, washing dishes, laundry)     Interactions/Relationships  no deficits     Life Areas  no deficits     Community and Social Life  no deficits     Participation Restrictions:   Going to gym       evolving clinical presentation with changing clinical characteristics    mod            GOALS: Pt is in agreement with the following goals.     Short term goals:  6 weeks or 10 visits   1.  Pt will demonstrate increased lumbar ROM by at least 3 degrees from the initial ROM value with improvements noted in functional ROM and ability to perform ADLs  2.  Pt will demonstrate increased improved lumbar strength on testing by 10 % with improvements  functionally noted with standing/walking/lifting groceries, self care  3.  Patient report a reduction in worst pain score by 1-2 points for improved tolerance during work and recreational activities  4.  Pt able to perform HEP correctly with minimal cueing or supervision for therapist        Long term goals: 13 weeks or 20 visits   1. Pt will demonstrate increased lumbar ROM by at least 12 degrees from initial ROM value, resulting in improved ability to perform functional fwd bending while standing and sitting.   2. Pt will demonstrate increased maximum isometric torque value by 25% when compared to the initial value resulting in improved ability to perform bending, lifting, and carrying activities safely, confidently.  3. Pt to demonstrate ability to independently control and reduce their pain through posture positioning and mechanical movements throughout a typical day.  4.  Patient will demonstrate improved overall function per FOTO Survey to CI = at least 1% but < 20% impaired, limited or restricted score or less.  5. Return to regular exercise at gym safely  6. No shifts of trunk or episodes of shifting  7. Sit with good posture  8. Good body mech with carpentry and household projects        Plan   Outpatient physical therapy 2x week for 13 weeks or 20 visits to include the following:   - Patient education  - Therapeutic exercise  - Manual therapy  - Performance testing   - Neuromuscular Re-education  - Therapeutic activity   - Modalities     Pt may be seen by PTA as part of the rehabilitation team.      Therapist: Carmen Hernandez, PT  4/12/2018

## 2018-04-30 ENCOUNTER — CLINICAL SUPPORT (OUTPATIENT)
Dept: REHABILITATION | Facility: OTHER | Age: 33
End: 2018-04-30
Attending: PHYSICAL MEDICINE & REHABILITATION
Payer: COMMERCIAL

## 2018-04-30 DIAGNOSIS — M54.50 CHRONIC BILATERAL LOW BACK PAIN WITHOUT SCIATICA: ICD-10-CM

## 2018-04-30 DIAGNOSIS — G89.29 CHRONIC BILATERAL LOW BACK PAIN WITHOUT SCIATICA: ICD-10-CM

## 2018-04-30 PROCEDURE — 97110 THERAPEUTIC EXERCISES: CPT

## 2018-04-30 NOTE — PROGRESS NOTES
Health  Consult Note    Name: Nicanor Solis  Clinic Number: [id number]  Physician: Eliza Harvey, *  Past Medical History:   Diagnosis Date    ADHD (attention deficit hyperactivity disorder)      Time In: 9:16 AM  Time Out:  9:55 AM    Subjective:   Patient reports that he eats healthy for the most part but does eat fried foods, unhealthy carbohydrates, and drinks regular beer.  PT skips breakfast sometimes because he does not want to feel full at school.  He just started to do a slow jog for 1.5 - 2 miles.  PT drinks about a gallon of water per day.      Objective:  Nicanor was instructed eat breakfast every day, even if it is a small portion.  I advised that he eat 1 starch serving for lunch and to eat protein (leaner) and vegetables for dinner.  Journal at least 7 days before our next appointment.  He will journal once he finishes with final exams (JOANNA).     Assessment:   Patient weight 185.6 lbs today 4/30/2018.  PT wants his goal weight to be between 155-165 lbs.  He drinks 1 gallon of water per day.      Plan:  Patient goals for next consult include journal for at least 7 days.  Light beer only and no fried foods.  Eat breakfast every day.  No starches for dinner.  Cardio at least 3 times per week and 1 day of weight training on his own.      Health : Beverly Noel  4/30/2018

## 2018-04-30 NOTE — PROGRESS NOTES
[facility]      Name: Nicanor Solis  Clinic Number: [id number]  Date of Treatment: 2018   Diagnosis:   No diagnosis found.  Physician: Eliza Harvey, *    Pain pattern determined: 1 PEP  Plan of care signed: 18   Time in: 8:00  Time Out: 9:00  Total Treatment time: 60  Precautions: Left shoulder injury year ago with current limited mobility  Visit #: 3    POC due:18  Reassessment due:18    Face to Face discussion of patient was done between PT and PTA.     Subjective   Nicanor reports having no LBP today. Pt states he isnt stretching as much as he should. He started a light jog with no LBP with activity. Educated him if he has pain with jogging to stop and walk. He understood.     Patient reports their pain to be 0/10 on a 0-10 scale with 0 being no pain and 10 being the worst pain imaginable.    Pain Location: LB     Work and leisure:  he jumped rope for 5 min yesterday, sometimes he runs, he wants to join a gym, he wants to box  , he doesn't exercise regularly and then he does activities and pushes too hard                   Pts goals:  Run, and be consistent with exercise      Objective   Date of testin18  ROM 0-36 deg   Max Peak Torque 126 ft lbs    Min Peak Torque 49 ft lbs    Flex/Ext Ratio 2.5/1   % below normative data -64%   Counter weight 215   Femur number 5   Seat pad 0               FOTO: Focus on Therapeutic Outcomes   Fear avoidance 41  Intake  28% Current Status CJ - At least 20 percent but less than 40 percent  Predicted< 20 % Goal Status+ CI -       Treatment    Pt was instructed in and performed the following:     Nicanor received therapeutic exercises to develop/improved posture, cardiovascular endurance, muscular endurance, lumbar/cervical ROM, strength and muscular endurance for 40 minutes including the following exercises:   [facility] 2018   Visit Number 4   VAS Pain Rating 0   Treadmill Time (in min.) 10    Speed 3   Extension in Lying 10   Extension in Standing 10   Flexion in Lying 10   Lumbar Extension Seat Pad -   Femur Restraint -   Top Dead Center -   Counterweight -   Lumbar Flexion -   Lumbar Extension -   Lumbar Peak Torque -   Min Torque -   Percent From Norm -   Lumbar Weight 71   Repetitions 19   Rating of Perceived Exertion 4   Ice - Z Lie (in min.) 10   Open book 10x  HS stretch c/ strap  LTR 10x    Peripheral muscle strengthening which included 1 set of 15-20 repetitions at a slow, controlled 7 second per rep pace focused on strengthening supporting musculature for improved body mechanics and functional mobility.  Pt and therapist focused on proper form during treatment to ensure optimal strengthening of each targeted muscle group.  Machines were utilized including torso rotation, leg extension, leg curl, chest press, upright row. Tricep extension, bicep curl, leg press, and hip abduction added on third visit.       Nicanor received the following manual therapy techniques: n/a were applied to the: n/a for 0 minutes.       Home Exercise Program as follows:    walk 3/week  Ext in standing during day at work 10 reps 3/day  Ext in lie at home 2/day 10 reps  Flexion in lie 2/day 10 reps  Use lumbar roll  Plan:  Address tight hip musculature     Handouts were given to the patient. Pt demo good understanding of the education provided. Nicanor demonstrated good return demonstration of activities.     Lumbar roll use compliance: unknown  Additional exercises taught this treatment session: added OB at home    Assessment   Pt tolerated treatment well and was able to complete 19 reps at increase weight of 5% with exertion of 4/10. Added LTR for HEP.  Pt able to perform prone extension without difficulty, however still reports difficulty with standing extension. No c/o pain with session.   Patient is making good progress towards established goals.  Pt will continue to benefit from skilled outpatient physical  therapy to address the deficits stated in the impairment chart, provide pt/family education and to maximize pt's level of independence in the home and community environment.       Pt's spiritual, cultural and educational needs considered and pt agreeable to plan of care and goals as stated below:     Medical necessity is demonstrated by the following IMPAIRMENTS/PROBLEMS:        Plan   Continue with established Plan of Care towards established PT goals.   History  Co-morbidities and personal factors that may impact the plan of care Examination  Body Structures and Functions, activity limitations and participation restrictions that may impact the plan of care Clinical Presentation    Decision Making/ Complexity Score   Co-morbidities:      Left shoulder issues        Personal Factors:   Sits all day     ADHD and some difficulty staying focused     Habit of exercising inconsistently and without good practice Body Regions:   back  lower extremities     Body Systems:   gross symmetry  ROM  strength  gross coordinated movement  transitions  motor control  motor learning     Activity limitations:   Learning and applying knowledge  no deficits     General Tasks and Commands  no deficits     Communication  no deficits     Mobility  using transportation (bus, train, plane, car)     Self care  looking after one's health     Domestic Life  doing house work (cleaning house, washing dishes, laundry)     Interactions/Relationships  no deficits     Life Areas  no deficits     Community and Social Life  no deficits     Participation Restrictions:   Going to gym       evolving clinical presentation with changing clinical characteristics    mod            GOALS: Pt is in agreement with the following goals.     Short term goals:  6 weeks or 10 visits   1.  Pt will demonstrate increased lumbar ROM by at least 3 degrees from the initial ROM value with improvements noted in functional ROM and ability to perform ADLs  2.  Pt will  demonstrate increased improved lumbar strength on testing by 10 % with improvements functionally noted with standing/walking/lifting groceries, self care  3.  Patient report a reduction in worst pain score by 1-2 points for improved tolerance during work and recreational activities  4.  Pt able to perform HEP correctly with minimal cueing or supervision for therapist        Long term goals: 13 weeks or 20 visits   1. Pt will demonstrate increased lumbar ROM by at least 12 degrees from initial ROM value, resulting in improved ability to perform functional fwd bending while standing and sitting.   2. Pt will demonstrate increased maximum isometric torque value by 25% when compared to the initial value resulting in improved ability to perform bending, lifting, and carrying activities safely, confidently.  3. Pt to demonstrate ability to independently control and reduce their pain through posture positioning and mechanical movements throughout a typical day.  4.  Patient will demonstrate improved overall function per FOTO Survey to CI = at least 1% but < 20% impaired, limited or restricted score or less.  5. Return to regular exercise at gym safely  6. No shifts of trunk or episodes of shifting  7. Sit with good posture  8. Good body mech with carpentry and household projects        Plan   Outpatient physical therapy 2x week for 13 weeks or 20 visits to include the following:   - Patient education  - Therapeutic exercise  - Manual therapy  - Performance testing   - Neuromuscular Re-education  - Therapeutic activity   - Modalities     Pt may be seen by PTA as part of the rehabilitation team.      Therapist: Carmen Hernandze, PT  4/12/2018

## 2018-05-04 ENCOUNTER — CLINICAL SUPPORT (OUTPATIENT)
Dept: REHABILITATION | Facility: OTHER | Age: 33
End: 2018-05-04
Attending: FAMILY MEDICINE
Payer: COMMERCIAL

## 2018-05-04 DIAGNOSIS — G89.29 CHRONIC BILATERAL LOW BACK PAIN WITHOUT SCIATICA: ICD-10-CM

## 2018-05-04 DIAGNOSIS — M54.50 CHRONIC BILATERAL LOW BACK PAIN WITHOUT SCIATICA: ICD-10-CM

## 2018-05-04 PROCEDURE — 97110 THERAPEUTIC EXERCISES: CPT

## 2018-05-04 NOTE — PROGRESS NOTES
[facility]      Name: Nicanor Solis  Clinic Number: [id number]  Date of Treatment: 2018   Diagnosis:   Encounter Diagnosis   Name Primary?    Chronic bilateral low back pain without sciatica      Physician: Eliza Harvey, *    Pain pattern determined: 1 PEP  Plan of care signed: 18   Time in: 8:00  Time Out: 9:00  Total Treatment time: 60  Precautions: Left shoulder injury year ago with current limited mobility  Visit #: 4    POC due:18  Reassessment due:18    Face to Face discussion of patient was done between PT and PTA.     Subjective  Pt reports no pain today and that he is being more active in general without pain.  Pt reports his muscles are sore from swimming.    Patient reports their pain to be 0/10 on a 0-10 scale with 0 being no pain and 10 being the worst pain imaginable.    Pain Location: LB     Work and leisure:  he jumped rope for 5 min yesterday, sometimes he runs, he wants to join a gym, he wants to box  , he doesn't exercise regularly and then he does activities and pushes too hard                   Pts goals:  Run, and be consistent with exercise      Objective   Date of testin18  ROM 0-36 deg   Max Peak Torque 126 ft lbs    Min Peak Torque 49 ft lbs    Flex/Ext Ratio 2.5/1   % below normative data -64%   Counter weight 215   Femur number 5   Seat pad 0               FOTO: Focus on Therapeutic Outcomes   Fear avoidance 41  Intake  28% Current Status CJ - At least 20 percent but less than 40 percent  Predicted< 20 % Goal Status+ CI -       Treatment    Pt was instructed in and performed the following:     Nicanor received therapeutic exercises to develop/improved posture, cardiovascular endurance, muscular endurance, lumbar/cervical ROM, strength and muscular endurance for 40 minutes including the following exercises:   [facility] 2018   Visit Number 5   VAS Pain Rating 0   Treadmill Time (in min.) 10   Speed 3    Extension in Lying 10   Extension in Standing 10   Flexion in Lying 10   Lumbar Extension Seat Pad -   Femur Restraint -   Top Dead Center -   Counterweight -   Lumbar Flexion -   Lumbar Extension -   Lumbar Peak Torque -   Min Torque -   Percent From Norm -   Lumbar Weight 71   Repetitions 20   Rating of Perceived Exertion 4   Ice - Z Lie (in min.) 10       HS stretch c/ strap  LTR 10x  EIS  Peripheral muscle strengthening which included 1 set of 15-20 repetitions at a slow, controlled 7 second per rep pace focused on strengthening supporting musculature for improved body mechanics and functional mobility.  Pt and therapist focused on proper form during treatment to ensure optimal strengthening of each targeted muscle group.  Machines were utilized including torso rotation, leg extension, leg curl, chest press, upright row. Tricep extension, bicep curl, leg press, and hip abduction added on third visit.       Nicanor received the following manual therapy techniques: n/a were applied to the: n/a for 0 minutes.       Home Exercise Program as follows:    walk 3/week  Ext in standing during day at work 10 reps 3/day  Ext in lie at home 2/day 10 reps  Flexion in lie 2/day 10 reps  Use lumbar roll  Plan:  Address tight hip musculature     Handouts were given to the patient. Pt demo good understanding of the education provided. Nicanor demonstrated good return demonstration of activities.     Lumbar roll use compliance: unknown  Additional exercises taught this treatment session: added OB at home    Assessment   Pt tolerated treatment well without increased pain during session. Able to complete 20 reps on Med X without increased pain.  Continue to progress as tolerated.  Verbal cues for EIS, pt extending cervical region initially  Pt will continue to benefit from skilled outpatient physical therapy to address the deficits stated in the impairment chart, provide pt/family education and to maximize pt's level of  independence in the home and community environment.       Pt's spiritual, cultural and educational needs considered and pt agreeable to plan of care and goals as stated below:     Medical necessity is demonstrated by the following IMPAIRMENTS/PROBLEMS:        Plan   Continue with established Plan of Care towards established PT goals.   History  Co-morbidities and personal factors that may impact the plan of care Examination  Body Structures and Functions, activity limitations and participation restrictions that may impact the plan of care Clinical Presentation    Decision Making/ Complexity Score   Co-morbidities:      Left shoulder issues        Personal Factors:   Sits all day     ADHD and some difficulty staying focused     Habit of exercising inconsistently and without good practice Body Regions:   back  lower extremities     Body Systems:   gross symmetry  ROM  strength  gross coordinated movement  transitions  motor control  motor learning     Activity limitations:   Learning and applying knowledge  no deficits     General Tasks and Commands  no deficits     Communication  no deficits     Mobility  using transportation (bus, train, plane, car)     Self care  looking after one's health     Domestic Life  doing house work (cleaning house, washing dishes, laundry)     Interactions/Relationships  no deficits     Life Areas  no deficits     Community and Social Life  no deficits     Participation Restrictions:   Going to gym       evolving clinical presentation with changing clinical characteristics    mod            GOALS: Pt is in agreement with the following goals.     Short term goals:  6 weeks or 10 visits   1.  Pt will demonstrate increased lumbar ROM by at least 3 degrees from the initial ROM value with improvements noted in functional ROM and ability to perform ADLs  2.  Pt will demonstrate increased improved lumbar strength on testing by 10 % with improvements functionally noted with  standing/walking/lifting groceries, self care  3.  Patient report a reduction in worst pain score by 1-2 points for improved tolerance during work and recreational activities  4.  Pt able to perform HEP correctly with minimal cueing or supervision for therapist        Long term goals: 13 weeks or 20 visits   1. Pt will demonstrate increased lumbar ROM by at least 12 degrees from initial ROM value, resulting in improved ability to perform functional fwd bending while standing and sitting.   2. Pt will demonstrate increased maximum isometric torque value by 25% when compared to the initial value resulting in improved ability to perform bending, lifting, and carrying activities safely, confidently.  3. Pt to demonstrate ability to independently control and reduce their pain through posture positioning and mechanical movements throughout a typical day.  4.  Patient will demonstrate improved overall function per FOTO Survey to CI = at least 1% but < 20% impaired, limited or restricted score or less.  5. Return to regular exercise at gym safely  6. No shifts of trunk or episodes of shifting  7. Sit with good posture  8. Good body mech with carpentry and household projects        Plan   Outpatient physical therapy 2x week for 13 weeks or 20 visits to include the following:   - Patient education  - Therapeutic exercise  - Manual therapy  - Performance testing   - Neuromuscular Re-education  - Therapeutic activity   - Modalities     Pt may be seen by PTA as part of the rehabilitation team.      Therapist: Carmen Hernandez, PT  4/12/2018

## 2018-05-08 ENCOUNTER — CLINICAL SUPPORT (OUTPATIENT)
Dept: REHABILITATION | Facility: OTHER | Age: 33
End: 2018-05-08
Attending: FAMILY MEDICINE
Payer: COMMERCIAL

## 2018-05-08 DIAGNOSIS — G89.29 CHRONIC BILATERAL LOW BACK PAIN WITHOUT SCIATICA: ICD-10-CM

## 2018-05-08 DIAGNOSIS — M54.50 CHRONIC BILATERAL LOW BACK PAIN WITHOUT SCIATICA: ICD-10-CM

## 2018-05-08 PROCEDURE — 97110 THERAPEUTIC EXERCISES: CPT

## 2018-05-08 NOTE — PROGRESS NOTES
[facility]      Name: Nicanor Solis  Clinic Number: [id number]  Date of Treatment: 2018   Diagnosis:   No diagnosis found.  Physician: Eliza Harvey, *    Pain pattern determined: 1 PEP  Plan of care signed: 18   Time in: 8:42  Time Out: 9:42  Total Treatment time: 60  Precautions: Left shoulder injury year ago with current limited mobility  Visit #: 5    POC due:18  Reassessment due:18    Face to Face discussion of patient was done between PT and PTA.     Subjective  Pt reports no pain or stiffness today and he is being more active in general without pain.  He is doing weight machines and swimming and encouraged him to cont his HEP stretch to prevent pain and stiffness.    Patient reports their pain to be 0/10 on a 0-10 scale with 0 being no pain and 10 being the worst pain imaginable.    Pain Location: LB     Work and leisure:  he jumped rope for 5 min yesterday, sometimes he runs, he wants to join a gym, he wants to box  , he doesn't exercise regularly and then he does activities and pushes too hard                   Pts goals:  Run, and be consistent with exercise      Objective   Date of testin18  ROM 0-36 deg   Max Peak Torque 126 ft lbs    Min Peak Torque 49 ft lbs    Flex/Ext Ratio 2.5/1   % below normative data -64%   Counter weight 215   Femur number 5   Seat pad 0               FOTO: Focus on Therapeutic Outcomes   Fear avoidance 41  Intake  28% Current Status CJ - At least 20 percent but less than 40 percent  Predicted< 20 % Goal Status+ CI -       Treatment    Pt was instructed in and performed the following:     Nicanor received therapeutic exercises to develop/improved posture, cardiovascular endurance, muscular endurance, lumbar/cervical ROM, strength and muscular endurance for 40 minutes including the following exercises:   [facility] 2018   Visit Number 6   VAS Pain Rating 0   Treadmill Time (in min.) 10   Speed 3    Extension in Lying 10   Extension in Standing 10   Flexion in Lying 10   Lumbar Extension Seat Pad -   Femur Restraint -   Top Dead Center -   Counterweight -   Lumbar Flexion -   Lumbar Extension -   Lumbar Peak Torque -   Min Torque -   Percent From Norm -   Lumbar Weight 75   Repetitions 20   Rating of Perceived Exertion 3   Ice - Z Lie (in min.) 10       HS stretch c/ strap  LTR 10x  EIS  Peripheral muscle strengthening which included 1 set of 15-20 repetitions at a slow, controlled 7 second per rep pace focused on strengthening supporting musculature for improved body mechanics and functional mobility.  Pt and therapist focused on proper form during treatment to ensure optimal strengthening of each targeted muscle group.  Machines were utilized including torso rotation, leg extension, leg curl, chest press, upright row. Tricep extension, bicep curl, leg press, and hip abduction added on third visit.       Nicanor received the following manual therapy techniques: n/a were applied to the: n/a for 0 minutes.       Home Exercise Program as follows:    walk 3/week  Ext in standing during day at work 10 reps 3/day  Ext in lie at home 2/day 10 reps  Flexion in lie 2/day 10 reps  Use lumbar roll  Plan:  Address tight hip musculature     Handouts were given to the patient. Pt demo good understanding of the education provided. Nicanor demonstrated good return demonstration of activities.     Lumbar roll use compliance: unknown  Additional exercises taught this treatment session: added OB at home    Assessment   Pt tolerated treatment well without increased pain during session. Able to complete 20 reps on Med X without increased pain.  Continue to progress as tolerated.  Verbal cues for EIS, pt extending cervical region initially. Pt progressing well at this time.   Pt will continue to benefit from skilled outpatient physical therapy to address the deficits stated in the impairment chart, provide pt/family education and  to maximize pt's level of independence in the home and community environment.       Pt's spiritual, cultural and educational needs considered and pt agreeable to plan of care and goals as stated below:     Medical necessity is demonstrated by the following IMPAIRMENTS/PROBLEMS:        Plan   Continue with established Plan of Care towards established PT goals.   History  Co-morbidities and personal factors that may impact the plan of care Examination  Body Structures and Functions, activity limitations and participation restrictions that may impact the plan of care Clinical Presentation    Decision Making/ Complexity Score   Co-morbidities:      Left shoulder issues        Personal Factors:   Sits all day     ADHD and some difficulty staying focused     Habit of exercising inconsistently and without good practice Body Regions:   back  lower extremities     Body Systems:   gross symmetry  ROM  strength  gross coordinated movement  transitions  motor control  motor learning     Activity limitations:   Learning and applying knowledge  no deficits     General Tasks and Commands  no deficits     Communication  no deficits     Mobility  using transportation (bus, train, plane, car)     Self care  looking after one's health     Domestic Life  doing house work (cleaning house, washing dishes, laundry)     Interactions/Relationships  no deficits     Life Areas  no deficits     Community and Social Life  no deficits     Participation Restrictions:   Going to gym       evolving clinical presentation with changing clinical characteristics    mod            GOALS: Pt is in agreement with the following goals.     Short term goals:  6 weeks or 10 visits   1.  Pt will demonstrate increased lumbar ROM by at least 3 degrees from the initial ROM value with improvements noted in functional ROM and ability to perform ADLs  2.  Pt will demonstrate increased improved lumbar strength on testing by 10 % with improvements functionally noted  with standing/walking/lifting groceries, self care  3.  Patient report a reduction in worst pain score by 1-2 points for improved tolerance during work and recreational activities  4.  Pt able to perform HEP correctly with minimal cueing or supervision for therapist        Long term goals: 13 weeks or 20 visits   1. Pt will demonstrate increased lumbar ROM by at least 12 degrees from initial ROM value, resulting in improved ability to perform functional fwd bending while standing and sitting.   2. Pt will demonstrate increased maximum isometric torque value by 25% when compared to the initial value resulting in improved ability to perform bending, lifting, and carrying activities safely, confidently.  3. Pt to demonstrate ability to independently control and reduce their pain through posture positioning and mechanical movements throughout a typical day.  4.  Patient will demonstrate improved overall function per FOTO Survey to CI = at least 1% but < 20% impaired, limited or restricted score or less.  5. Return to regular exercise at gym safely  6. No shifts of trunk or episodes of shifting  7. Sit with good posture  8. Good body mech with carpentry and household projects        Plan   Outpatient physical therapy 2x week for 13 weeks or 20 visits to include the following:   - Patient education  - Therapeutic exercise  - Manual therapy  - Performance testing   - Neuromuscular Re-education  - Therapeutic activity   - Modalities     Pt may be seen by PTA as part of the rehabilitation team.      Therapist: Carmen Hernandez, PT  4/12/2018

## 2018-05-11 ENCOUNTER — CLINICAL SUPPORT (OUTPATIENT)
Dept: REHABILITATION | Facility: OTHER | Age: 33
End: 2018-05-11
Attending: FAMILY MEDICINE
Payer: COMMERCIAL

## 2018-05-11 DIAGNOSIS — G89.29 CHRONIC BILATERAL LOW BACK PAIN WITHOUT SCIATICA: ICD-10-CM

## 2018-05-11 DIAGNOSIS — M54.50 CHRONIC BILATERAL LOW BACK PAIN WITHOUT SCIATICA: ICD-10-CM

## 2018-05-11 PROCEDURE — 97110 THERAPEUTIC EXERCISES: CPT

## 2018-05-11 NOTE — PROGRESS NOTES
"[facility]      Name: Nicanor Solis  Clinic Number: [id number]  Date of Treatment: 2018   Diagnosis:   No diagnosis found.  Physician: Eliza Harvey, *    Pain pattern determined: 1 PEP  Plan of care signed: 18   Time in: 8:00  Time Out: 9:00  Total Treatment time: 60  Precautions: Left shoulder injury year ago with current limited mobility  Visit #: 6 (reassessemnt next session)    POC due:18  Reassessment due:18    Face to Face discussion of patient was done between PT and PTA.     Subjective  Pt reports no pain, but did have minimal  stiffness today. He stated, I feel like I have more spring in my step.".   He is doing weight machines and swimming and encouraged him to cont his HEP stretch to prevent pain and stiffness.    Patient reports their pain to be 0/10 on a 0-10 scale with 0 being no pain and 10 being the worst pain imaginable.    Pain Location: LB     Work and leisure:  he jumped rope for 5 min yesterday, sometimes he runs, he wants to join a gym, he wants to box  , he doesn't exercise regularly and then he does activities and pushes too hard                   Pts goals:  Run, and be consistent with exercise      Objective   Date of testin18  ROM 0-36 deg   Max Peak Torque 126 ft lbs    Min Peak Torque 49 ft lbs    Flex/Ext Ratio 2.5/1   % below normative data -64%   Counter weight 215   Femur number 5   Seat pad 0               FOTO: Focus on Therapeutic Outcomes   Fear avoidance 41  Intake  28% Current Status CJ - At least 20 percent but less than 40 percent  Predicted< 20 % Goal Status+ CI -       Treatment    Pt was instructed in and performed the following:     Nicanor received therapeutic exercises to develop/improved posture, cardiovascular endurance, muscular endurance, lumbar/cervical ROM, strength and muscular endurance for 40 minutes including the following exercises:   [facility] 2018   Visit Number 7   VAS " Pain Rating 0   Treadmill Time (in min.) 10   Speed 3   Extension in Lying 10   Extension in Standing 10   Flexion in Lying 10   Lumbar Extension Seat Pad -   Femur Restraint -   Top Dead Center -   Counterweight -   Lumbar Flexion -   Lumbar Extension -   Lumbar Peak Torque -   Min Torque -   Percent From Norm -   Lumbar Weight 78   Repetitions 20   Rating of Perceived Exertion 4   Ice - Z Lie (in min.) 10   HS stretch c/ strap  LTR 10x  EIS  Peripheral muscle strengthening which included 1 set of 15-20 repetitions at a slow, controlled 7 second per rep pace focused on strengthening supporting musculature for improved body mechanics and functional mobility.  Pt and therapist focused on proper form during treatment to ensure optimal strengthening of each targeted muscle group.  Machines were utilized including torso rotation, leg extension, leg curl, chest press, upright row. Tricep extension, bicep curl, leg press, and hip abduction added on third visit.       Nicanor received the following manual therapy techniques: n/a were applied to the: n/a for 0 minutes.       Home Exercise Program as follows:    walk 3/week  Ext in standing during day at work 10 reps 3/day  Ext in lie at home 2/day 10 reps  Flexion in lie 2/day 10 reps  Use lumbar roll  Plan:  Address tight hip musculature     Handouts were given to the patient. Pt demo good understanding of the education provided. Nicanor demonstrated good return demonstration of activities.     Lumbar roll use compliance: unknown  Additional exercises taught this treatment session: added OB at home    Assessment   Pt tolerated treatment well without increased pain during session. Able to complete 20 reps with a weight increase on Med X without increased pain.  Continue to progress as tolerated. Pt performed EIS better today with no Pt progressing well at this time.   Pt will continue to benefit from skilled outpatient physical therapy to address the deficits stated in the  impairment chart, provide pt/family education and to maximize pt's level of independence in the home and community environment.       Pt's spiritual, cultural and educational needs considered and pt agreeable to plan of care and goals as stated below:     Medical necessity is demonstrated by the following IMPAIRMENTS/PROBLEMS:        Plan   Continue with established Plan of Care towards established PT goals.   History  Co-morbidities and personal factors that may impact the plan of care Examination  Body Structures and Functions, activity limitations and participation restrictions that may impact the plan of care Clinical Presentation    Decision Making/ Complexity Score   Co-morbidities:      Left shoulder issues        Personal Factors:   Sits all day     ADHD and some difficulty staying focused     Habit of exercising inconsistently and without good practice Body Regions:   back  lower extremities     Body Systems:   gross symmetry  ROM  strength  gross coordinated movement  transitions  motor control  motor learning     Activity limitations:   Learning and applying knowledge  no deficits     General Tasks and Commands  no deficits     Communication  no deficits     Mobility  using transportation (bus, train, plane, car)     Self care  looking after one's health     Domestic Life  doing house work (cleaning house, washing dishes, laundry)     Interactions/Relationships  no deficits     Life Areas  no deficits     Community and Social Life  no deficits     Participation Restrictions:   Going to gym       evolving clinical presentation with changing clinical characteristics    mod            GOALS: Pt is in agreement with the following goals.     Short term goals:  6 weeks or 10 visits   1.  Pt will demonstrate increased lumbar ROM by at least 3 degrees from the initial ROM value with improvements noted in functional ROM and ability to perform ADLs  2.  Pt will demonstrate increased improved lumbar strength on  testing by 10 % with improvements functionally noted with standing/walking/lifting groceries, self care  3.  Patient report a reduction in worst pain score by 1-2 points for improved tolerance during work and recreational activities  4.  Pt able to perform HEP correctly with minimal cueing or supervision for therapist        Long term goals: 13 weeks or 20 visits   1. Pt will demonstrate increased lumbar ROM by at least 12 degrees from initial ROM value, resulting in improved ability to perform functional fwd bending while standing and sitting.   2. Pt will demonstrate increased maximum isometric torque value by 25% when compared to the initial value resulting in improved ability to perform bending, lifting, and carrying activities safely, confidently.  3. Pt to demonstrate ability to independently control and reduce their pain through posture positioning and mechanical movements throughout a typical day.  4.  Patient will demonstrate improved overall function per FOTO Survey to CI = at least 1% but < 20% impaired, limited or restricted score or less.  5. Return to regular exercise at gym safely  6. No shifts of trunk or episodes of shifting  7. Sit with good posture  8. Good body mech with carpentry and household projects        Plan   Outpatient physical therapy 2x week for 13 weeks or 20 visits to include the following:   - Patient education  - Therapeutic exercise  - Manual therapy  - Performance testing   - Neuromuscular Re-education  - Therapeutic activity   - Modalities     Pt may be seen by PTA as part of the rehabilitation team.

## 2018-05-11 NOTE — PROGRESS NOTES
Health  Consult Note    Name: Nicanor Solis  Clinic Number: [id number]  Physician: Eliza Harvey, *  Past Medical History:   Diagnosis Date    ADHD (attention deficit hyperactivity disorder)      Time In: 9:10 AM  Time Out:  9:32 AM    Subjective:   Patient reports that he is done with exams at [facility] and will be able to start journaling.  He has been eating better but did have fried food twice since his last visit.  PT mainly drank light beer.  He is headed to the beach for a week and will be able to eat fairly healthy because he is going with his wife's family.  They are all trying to make healthier choices and will be cooking healthy meals.  They also plan on doing yoga every morning and he plans on joining them.  PT also plans on walking on the beach on most mornings. He has been eating breakfast every morning.  Nicanor did cardio 4 times this week (swam 2 times, elliptical, and walked on the treadmill at 10% incline).  He also did weight training twice this week (whole body 1 set and 20 repetitions).     Objective:  Nicanor was instructed eat breakfast every day, even if it is a small portion.  I advised that he eat 1 starch serving for lunch and to eat protein (leaner) and vegetables for dinner.  He is having a hard time getting his wife to stick to vegetables and protein for dinner; she usually likes to prepare 1-2 starches with dinner.      Assessment:   Patient weight 185.6 lbs today 4/30/2018.  PT wants his goal weight to be between 155-165 lbs.  He drinks 1 gallon of water per day.      Plan:  Patient goals for next consult include journal for at least 7 days. I advised him to prepare foods ahead of time (grill chicken balls, turkey burger, and chicken breast).  I informed him to check out [employer] for great deals on Tuesdays.  He will be weighing in very soon.        Health : Beverly Noel  5/11/2018

## 2018-12-19 ENCOUNTER — DOCUMENTATION ONLY (OUTPATIENT)
Dept: REHABILITATION | Facility: OTHER | Age: 33
End: 2018-12-19

## 2018-12-19 NOTE — PROGRESS NOTES
Outpatient Physical Therapy Discharge Summary    Date: 12/19/2018      Date of PT eval: 4/12/18  Number of PT visits: 6  Date of last visit: 5/11/18    Assessment:  Unable to assess if goals met secondary to lack of attendance. Pt cancelled last session and did not follow up to reschedule. Per chart review, pt reported return to gym without adverse effect.     Short term goals met: 0/4  Long term goals met: 0/8    Plan: Discharge from outpatient physical therapy due to failure to return.

## 2020-11-06 NOTE — PROGRESS NOTES
Subjective:       Patient ID: Nicanor Solis is a 31 y.o. male.    Chief Complaint: Annual Exam    Pt here for annual exam. Counseled on exercise goals. Feels well.     He has prior dx of ADHD and is interested in resuming adderall. It has been 10 years since taking it but was effective at the time. Not depressed. No SI/HI. We discussed options and he would like to start adderall. Prior record reviewed.       Review of Systems   Constitutional: Negative for fatigue, fever and unexpected weight change.   HENT: Negative for congestion, rhinorrhea and sore throat.    Eyes: Negative for visual disturbance.   Respiratory: Negative for cough and shortness of breath.    Cardiovascular: Negative for chest pain, palpitations and leg swelling.   Gastrointestinal: Negative for abdominal pain, blood in stool, constipation and diarrhea.   Genitourinary: Negative for difficulty urinating and dysuria.   Skin: Negative for color change and rash.   Neurological: Negative for dizziness and syncope.   Hematological: Negative for adenopathy.   Psychiatric/Behavioral: Negative for dysphoric mood.       Objective:      Physical Exam   Constitutional: He is oriented to person, place, and time. He appears well-developed and well-nourished.   HENT:   Head: Normocephalic and atraumatic.   Right Ear: External ear normal.   Left Ear: External ear normal.   Mouth/Throat: Oropharynx is clear and moist. No oropharyngeal exudate.   Eyes: Conjunctivae and EOM are normal. Pupils are equal, round, and reactive to light.   Neck: Neck supple. Carotid bruit is not present. No thyromegaly present.   Cardiovascular: Normal rate, regular rhythm, S1 normal, S2 normal, normal heart sounds and intact distal pulses.    Pulmonary/Chest: Effort normal and breath sounds normal.   Abdominal: Soft. Bowel sounds are normal. He exhibits no mass. There is no hepatosplenomegaly. There is no tenderness.   Musculoskeletal: He exhibits no edema.   Lymphadenopathy:     He  has no cervical adenopathy.   Neurological: He is alert and oriented to person, place, and time. No cranial nerve deficit.   Psychiatric: He has a normal mood and affect. His behavior is normal.       Assessment:       1. Wellness examination    2. Attention deficit hyperactivity disorder (ADHD), predominantly inattentive type        Plan:       1. Appropriate labs  2. adderall 10mg daily--proper use d/w pt; 3 separate 30 days rxs given for 90 days total; he is aware of refill policy        No

## 2021-01-05 ENCOUNTER — PATIENT MESSAGE (OUTPATIENT)
Dept: ADMINISTRATIVE | Facility: HOSPITAL | Age: 36
End: 2021-01-05

## 2021-03-31 ENCOUNTER — TELEPHONE (OUTPATIENT)
Dept: INTERNAL MEDICINE | Facility: CLINIC | Age: 36
End: 2021-03-31

## 2022-01-11 ENCOUNTER — OFFICE VISIT (OUTPATIENT)
Dept: INTERNAL MEDICINE | Facility: CLINIC | Age: 37
End: 2022-01-11
Payer: COMMERCIAL

## 2022-01-11 VITALS
BODY MASS INDEX: 32.04 KG/M2 | DIASTOLIC BLOOD PRESSURE: 78 MMHG | OXYGEN SATURATION: 98 % | HEART RATE: 64 BPM | SYSTOLIC BLOOD PRESSURE: 118 MMHG | HEIGHT: 67 IN | WEIGHT: 204.13 LBS

## 2022-01-11 DIAGNOSIS — L23.9 ALLERGIC CONTACT DERMATITIS, UNSPECIFIED TRIGGER: ICD-10-CM

## 2022-01-11 DIAGNOSIS — L50.3 DERMATOGRAPHISM: Primary | ICD-10-CM

## 2022-01-11 PROCEDURE — 99999 PR PBB SHADOW E&M-EST. PATIENT-LVL IV: CPT | Mod: PBBFAC,,, | Performed by: STUDENT IN AN ORGANIZED HEALTH CARE EDUCATION/TRAINING PROGRAM

## 2022-01-11 PROCEDURE — 99999 PR PBB SHADOW E&M-EST. PATIENT-LVL IV: ICD-10-PCS | Mod: PBBFAC,,, | Performed by: STUDENT IN AN ORGANIZED HEALTH CARE EDUCATION/TRAINING PROGRAM

## 2022-01-11 PROCEDURE — 99214 PR OFFICE/OUTPT VISIT, EST, LEVL IV, 30-39 MIN: ICD-10-PCS | Mod: S$GLB,,, | Performed by: STUDENT IN AN ORGANIZED HEALTH CARE EDUCATION/TRAINING PROGRAM

## 2022-01-11 PROCEDURE — 99214 OFFICE O/P EST MOD 30 MIN: CPT | Mod: S$GLB,,, | Performed by: STUDENT IN AN ORGANIZED HEALTH CARE EDUCATION/TRAINING PROGRAM

## 2022-01-11 RX ORDER — HYDROCORTISONE 25 MG/G
CREAM TOPICAL 2 TIMES DAILY
Qty: 20 G | Refills: 0 | Status: ON HOLD | OUTPATIENT
Start: 2022-01-11 | End: 2022-06-26 | Stop reason: HOSPADM

## 2022-01-11 RX ORDER — CETIRIZINE HYDROCHLORIDE 10 MG/1
10 TABLET ORAL DAILY
Qty: 90 TABLET | Refills: 3 | Status: SHIPPED | OUTPATIENT
Start: 2022-01-11 | End: 2023-01-11

## 2022-01-11 NOTE — PROGRESS NOTES
"[facility]    Subjective:       Patient ID: Nicanor Solis is a 36 y.o. male.    Chief Complaint: Rash      History was obtained from the patient and supplemented through chart review.  This patient is normally followed by a colleague of [facility], who is unavailable today.  The patient is new to me.    HPI:    Patient is a 36 y.o. 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         Surgical hx, family hx, social hx   Have been reviewed      Current Outpatient Medications:     cetirizine (ZYRTEC) 10 MG tablet, Take 1 tablet (10 mg total) by mouth once daily., Disp: 90 tablet, Rfl: 3    hydrocortisone 2.5 % cream, Apply topically 2 (two) times daily., Disp: 20 g, Rfl: 0    Objective:        Body mass index is 31.97 kg/m².  Vitals:    01/11/22 1524   BP: 118/78   Pulse: 64   SpO2: 98%   Weight: 92.6 kg (204 lb 2.3 oz)   Height: 5' 7" (1.702 m)   PainSc: 0-No pain     Physical Exam  Vitals and nursing note reviewed.   Constitutional:       General: He is not in acute distress.     Appearance: Normal appearance. He is not ill-appearing.   HENT:      Head: Normocephalic and atraumatic.      Nose:      Comments: Wearing a mask  Eyes:      General: No scleral icterus.  Pulmonary:      Effort: Pulmonary effort is normal.   Abdominal:      General: There is no distension.   Musculoskeletal:         General: No deformity.      Cervical back: Normal range of motion.   Skin:     General: Skin is warm and dry.   Neurological:      Mental " Status: He is alert and oriented to person, place, and time.   Psychiatric:         Behavior: Behavior normal.                   Lab Results   Component Value Date    WBC 5.57 09/15/2017    HGB 13.5 (L) 09/15/2017    HCT 40.3 09/15/2017     09/15/2017    CHOL 185 09/15/2017    TRIG 162 (H) 09/15/2017    HDL 36 (L) 09/15/2017    ALT 24 09/15/2017    AST 19 09/15/2017     09/15/2017    K 3.9 09/15/2017     09/15/2017    CREATININE 0.8 09/15/2017    BUN 11 09/15/2017    CO2 24 09/15/2017    TSH 0.821 12/08/2014    HGBA1C 5.4 12/08/2014       The ASCVD Risk score ([place]robbin SEGOVIA Jr., et al., 2013) failed to calculate for the following reasons:    The 2013 ASCVD risk score is only valid for ages 40 to 79    (Imaging have been independently reviewed)      Assessment:         1. Dermatographism    2. Allergic contact dermatitis, unspecified trigger          Plan:     Nicanor was seen today for rash.    Diagnoses and all orders for this visit:    Dermatographism    Allergic contact dermatitis, unspecified trigger  -     cetirizine (ZYRTEC) 10 MG tablet; Take 1 tablet (10 mg total) by mouth once daily.  -     hydrocortisone 2.5 % cream; Apply topically 2 (two) times daily.  -     Ambulatory referral/consult to Dermatology; Future      Follow up if symptoms worsen or fail to improve.        Pt wishes to establish care with me. First available    All medications were reviewed including potential side effects and risks/benefits.  Pt was counseled to call back if anything worsens or if questions arise.    Leon Baum MD  Family Medicine  [facility]  [address]  [address]  [place] [address]  Phone [phone]  Fax [phone]

## 2022-02-02 NOTE — PROGRESS NOTES
Subjective:       Patient ID: Nicanor Solis is a 36 y.o. male.    Chief Complaint: Annual Exam    Pt here for annual exam. Counseled on exercise goals.       Review of Systems   Constitutional: Negative for fatigue, fever and unexpected weight change.   HENT: Negative for congestion, rhinorrhea and sore throat.    Eyes: Negative for visual disturbance.   Respiratory: Negative for cough and shortness of breath.    Cardiovascular: Negative for chest pain, palpitations and leg swelling.   Gastrointestinal: Negative for abdominal pain, blood in stool, constipation and diarrhea.   Genitourinary: Negative for difficulty urinating and dysuria.   Skin: Negative for color change and rash.   Neurological: Negative for dizziness and syncope.   Hematological: Negative for adenopathy.   Psychiatric/Behavioral: Negative for dysphoric mood.       Objective:      Physical Exam  Constitutional:       Appearance: He is well-developed.   HENT:      Head: Normocephalic and atraumatic.      Right Ear: External ear normal.      Left Ear: External ear normal.      Mouth/Throat:      Pharynx: No oropharyngeal exudate.   Eyes:      Conjunctiva/sclera: Conjunctivae normal.      Pupils: Pupils are equal, round, and reactive to light.   Neck:      Thyroid: No thyromegaly.      Vascular: No carotid bruit.   Cardiovascular:      Rate and Rhythm: Normal rate and regular rhythm.      Heart sounds: Normal heart sounds, S1 normal and S2 normal.   Pulmonary:      Effort: Pulmonary effort is normal.      Breath sounds: Normal breath sounds.   Abdominal:      General: Bowel sounds are normal.      Palpations: Abdomen is soft. There is no mass.      Tenderness: There is no abdominal tenderness.   Musculoskeletal:      Cervical back: Neck supple.   Lymphadenopathy:      Cervical: No cervical adenopathy.   Neurological:      Mental Status: He is alert and oriented to person, place, and time.      Cranial Nerves: No cranial nerve deficit.   Psychiatric:          Behavior: Behavior normal.         Assessment:       1. Wellness examination        Plan:       1. Appropriate labs

## 2022-02-03 ENCOUNTER — LAB VISIT (OUTPATIENT)
Dept: LAB | Facility: OTHER | Age: 37
End: 2022-02-03
Attending: INTERNAL MEDICINE
Payer: COMMERCIAL

## 2022-02-03 ENCOUNTER — OFFICE VISIT (OUTPATIENT)
Dept: INTERNAL MEDICINE | Facility: CLINIC | Age: 37
End: 2022-02-03
Payer: COMMERCIAL

## 2022-02-03 VITALS
WEIGHT: 202.94 LBS | HEART RATE: 59 BPM | HEIGHT: 67 IN | BODY MASS INDEX: 31.85 KG/M2 | SYSTOLIC BLOOD PRESSURE: 134 MMHG | OXYGEN SATURATION: 98 % | DIASTOLIC BLOOD PRESSURE: 84 MMHG

## 2022-02-03 DIAGNOSIS — Z00.00 WELLNESS EXAMINATION: ICD-10-CM

## 2022-02-03 DIAGNOSIS — Z00.00 WELLNESS EXAMINATION: Primary | ICD-10-CM

## 2022-02-03 LAB
ALBUMIN SERPL BCP-MCNC: 4.5 G/DL (ref 3.5–5.2)
ALP SERPL-CCNC: 47 U/L (ref 55–135)
ALT SERPL W/O P-5'-P-CCNC: 25 U/L (ref 10–44)
ANION GAP SERPL CALC-SCNC: 8 MMOL/L (ref 8–16)
AST SERPL-CCNC: 23 U/L (ref 10–40)
BASOPHILS # BLD AUTO: 0.03 K/UL (ref 0–0.2)
BASOPHILS NFR BLD: 0.5 % (ref 0–1.9)
BILIRUB SERPL-MCNC: 0.4 MG/DL (ref 0.1–1)
BUN SERPL-MCNC: 14 MG/DL (ref 6–20)
CALCIUM SERPL-MCNC: 9.2 MG/DL (ref 8.7–10.5)
CHLORIDE SERPL-SCNC: 104 MMOL/L (ref 95–110)
CHOLEST SERPL-MCNC: 187 MG/DL (ref 120–199)
CHOLEST/HDLC SERPL: 4.8 {RATIO} (ref 2–5)
CO2 SERPL-SCNC: 25 MMOL/L (ref 23–29)
CREAT SERPL-MCNC: 0.8 MG/DL (ref 0.5–1.4)
DIFFERENTIAL METHOD: NORMAL
EOSINOPHIL # BLD AUTO: 0.3 K/UL (ref 0–0.5)
EOSINOPHIL NFR BLD: 4.8 % (ref 0–8)
ERYTHROCYTE [DISTWIDTH] IN BLOOD BY AUTOMATED COUNT: 12.3 % (ref 11.5–14.5)
EST. GFR  (AFRICAN AMERICAN): >60 ML/MIN/1.73 M^2
EST. GFR  (NON AFRICAN AMERICAN): >60 ML/MIN/1.73 M^2
GLUCOSE SERPL-MCNC: 89 MG/DL (ref 70–110)
HCT VFR BLD AUTO: 44 % (ref 40–54)
HCV AB SERPL QL IA: NEGATIVE
HDLC SERPL-MCNC: 39 MG/DL (ref 40–75)
HDLC SERPL: 20.9 % (ref 20–50)
HGB BLD-MCNC: 14.5 G/DL (ref 14–18)
HIV 1+2 AB+HIV1 P24 AG SERPL QL IA: NEGATIVE
IMM GRANULOCYTES # BLD AUTO: 0.02 K/UL (ref 0–0.04)
IMM GRANULOCYTES NFR BLD AUTO: 0.3 % (ref 0–0.5)
LDLC SERPL CALC-MCNC: 109.8 MG/DL (ref 63–159)
LYMPHOCYTES # BLD AUTO: 2.1 K/UL (ref 1–4.8)
LYMPHOCYTES NFR BLD: 35 % (ref 18–48)
MCH RBC QN AUTO: 29 PG (ref 27–31)
MCHC RBC AUTO-ENTMCNC: 33 G/DL (ref 32–36)
MCV RBC AUTO: 88 FL (ref 82–98)
MONOCYTES # BLD AUTO: 0.5 K/UL (ref 0.3–1)
MONOCYTES NFR BLD: 7.9 % (ref 4–15)
NEUTROPHILS # BLD AUTO: 3.1 K/UL (ref 1.8–7.7)
NEUTROPHILS NFR BLD: 51.5 % (ref 38–73)
NONHDLC SERPL-MCNC: 148 MG/DL
NRBC BLD-RTO: 0 /100 WBC
PLATELET # BLD AUTO: 193 K/UL (ref 150–450)
PMV BLD AUTO: 10.6 FL (ref 9.2–12.9)
POTASSIUM SERPL-SCNC: 4.5 MMOL/L (ref 3.5–5.1)
PROT SERPL-MCNC: 7.7 G/DL (ref 6–8.4)
RBC # BLD AUTO: 5 M/UL (ref 4.6–6.2)
SODIUM SERPL-SCNC: 137 MMOL/L (ref 136–145)
TRIGL SERPL-MCNC: 191 MG/DL (ref 30–150)
WBC # BLD AUTO: 6.06 K/UL (ref 3.9–12.7)

## 2022-02-03 PROCEDURE — 99395 PREV VISIT EST AGE 18-39: CPT | Mod: S$GLB,,, | Performed by: INTERNAL MEDICINE

## 2022-02-03 PROCEDURE — 99395 PR PREVENTIVE VISIT,EST,18-39: ICD-10-PCS | Mod: S$GLB,,, | Performed by: INTERNAL MEDICINE

## 2022-02-03 PROCEDURE — 99999 PR PBB SHADOW E&M-EST. PATIENT-LVL III: ICD-10-PCS | Mod: PBBFAC,,, | Performed by: INTERNAL MEDICINE

## 2022-02-03 PROCEDURE — 80053 COMPREHEN METABOLIC PANEL: CPT | Performed by: INTERNAL MEDICINE

## 2022-02-03 PROCEDURE — 86803 HEPATITIS C AB TEST: CPT | Performed by: INTERNAL MEDICINE

## 2022-02-03 PROCEDURE — 85025 COMPLETE CBC W/AUTO DIFF WBC: CPT | Performed by: INTERNAL MEDICINE

## 2022-02-03 PROCEDURE — 36415 COLL VENOUS BLD VENIPUNCTURE: CPT | Performed by: INTERNAL MEDICINE

## 2022-02-03 PROCEDURE — 80061 LIPID PANEL: CPT | Performed by: INTERNAL MEDICINE

## 2022-02-03 PROCEDURE — 99999 PR PBB SHADOW E&M-EST. PATIENT-LVL III: CPT | Mod: PBBFAC,,, | Performed by: INTERNAL MEDICINE

## 2022-02-03 PROCEDURE — 87389 HIV-1 AG W/HIV-1&-2 AB AG IA: CPT | Performed by: INTERNAL MEDICINE

## 2022-02-24 ENCOUNTER — PATIENT MESSAGE (OUTPATIENT)
Dept: INTERNAL MEDICINE | Facility: CLINIC | Age: 37
End: 2022-02-24
Payer: COMMERCIAL

## 2022-06-25 ENCOUNTER — HOSPITAL ENCOUNTER (INPATIENT)
Facility: HOSPITAL | Age: 37
LOS: 2 days | Discharge: HOME | DRG: 641 | End: 2022-06-27
Attending: EMERGENCY MEDICINE | Admitting: INTERNAL MEDICINE
Payer: COMMERCIAL

## 2022-06-25 DIAGNOSIS — I49.9 ARRHYTHMIA: ICD-10-CM

## 2022-06-25 DIAGNOSIS — R61 DIAPHORESIS: ICD-10-CM

## 2022-06-25 DIAGNOSIS — R41.82 ALTERED MENTAL STATUS: ICD-10-CM

## 2022-06-25 DIAGNOSIS — E87.1 HYPONATREMIA: Primary | ICD-10-CM

## 2022-06-25 PROBLEM — E87.20 LACTIC ACIDOSIS: Status: ACTIVE | Noted: 2022-06-25

## 2022-06-25 PROBLEM — D72.829 LEUKOCYTOSIS: Status: ACTIVE | Noted: 2022-06-25

## 2022-06-25 LAB
ALBUMIN SERPL BCP-MCNC: 4 G/DL (ref 3.5–5.2)
ALP SERPL-CCNC: 53 U/L (ref 55–135)
ALT SERPL W/O P-5'-P-CCNC: 54 U/L (ref 10–44)
AMPHET+METHAMPHET UR QL: NEGATIVE
ANION GAP SERPL CALC-SCNC: 11 MMOL/L (ref 8–16)
ANION GAP SERPL CALC-SCNC: 13 MMOL/L (ref 8–16)
AST SERPL-CCNC: 91 U/L (ref 10–40)
BARBITURATES UR QL SCN>200 NG/ML: NEGATIVE
BASOPHILS # BLD AUTO: 0.07 K/UL (ref 0–0.2)
BASOPHILS NFR BLD: 0.5 % (ref 0–1.9)
BENZODIAZ UR QL SCN>200 NG/ML: NEGATIVE
BILIRUB SERPL-MCNC: 1 MG/DL (ref 0.1–1)
BILIRUB UR QL STRIP: NEGATIVE
BNP SERPL-MCNC: <10 PG/ML (ref 0–99)
BUN SERPL-MCNC: 12 MG/DL (ref 6–30)
BUN SERPL-MCNC: 7 MG/DL (ref 6–20)
BUN SERPL-MCNC: 9 MG/DL (ref 6–20)
BZE UR QL SCN: NEGATIVE
CALCIUM SERPL-MCNC: 8.1 MG/DL (ref 8.7–10.5)
CALCIUM SERPL-MCNC: 8.5 MG/DL (ref 8.7–10.5)
CANNABINOIDS UR QL SCN: NEGATIVE
CHLORIDE SERPL-SCNC: 85 MMOL/L (ref 95–110)
CHLORIDE SERPL-SCNC: 87 MMOL/L (ref 95–110)
CHLORIDE SERPL-SCNC: 92 MMOL/L (ref 95–110)
CK SERPL-CCNC: 2854 U/L (ref 20–200)
CLARITY UR REFRACT.AUTO: CLEAR
CO2 SERPL-SCNC: 21 MMOL/L (ref 23–29)
COLOR UR AUTO: YELLOW
CREAT SERPL-MCNC: 0.7 MG/DL (ref 0.5–1.4)
CREAT SERPL-MCNC: 0.8 MG/DL (ref 0.5–1.4)
CREAT UR-MCNC: 86 MG/DL (ref 23–375)
CREAT UR-MCNC: 86 MG/DL (ref 23–375)
DIFFERENTIAL METHOD: ABNORMAL
EOSINOPHIL # BLD AUTO: 0.4 K/UL (ref 0–0.5)
EOSINOPHIL NFR BLD: 2.6 % (ref 0–8)
ERYTHROCYTE [DISTWIDTH] IN BLOOD BY AUTOMATED COUNT: 12.2 % (ref 11.5–14.5)
EST. GFR  (AFRICAN AMERICAN): >60 ML/MIN/1.73 M^2
EST. GFR  (NON AFRICAN AMERICAN): >60 ML/MIN/1.73 M^2
ETHANOL SERPL-MCNC: <10 MG/DL
GLUCOSE SERPL-MCNC: 114 MG/DL (ref 70–110)
GLUCOSE SERPL-MCNC: 127 MG/DL (ref 70–110)
GLUCOSE SERPL-MCNC: 148 MG/DL (ref 70–110)
GLUCOSE UR QL STRIP: NEGATIVE
HCT VFR BLD AUTO: 36.6 % (ref 40–54)
HCT VFR BLD CALC: 40 %PCV (ref 36–54)
HGB BLD-MCNC: 12.8 G/DL (ref 14–18)
HGB UR QL STRIP: NEGATIVE
IMM GRANULOCYTES # BLD AUTO: 0.07 K/UL (ref 0–0.04)
IMM GRANULOCYTES NFR BLD AUTO: 0.5 % (ref 0–0.5)
KETONES UR QL STRIP: ABNORMAL
LACTATE SERPL-SCNC: 4 MMOL/L (ref 0.5–2.2)
LEUKOCYTE ESTERASE UR QL STRIP: NEGATIVE
LYMPHOCYTES # BLD AUTO: 3.6 K/UL (ref 1–4.8)
LYMPHOCYTES NFR BLD: 26.9 % (ref 18–48)
MAGNESIUM SERPL-MCNC: 1.3 MG/DL (ref 1.6–2.6)
MCH RBC QN AUTO: 30 PG (ref 27–31)
MCHC RBC AUTO-ENTMCNC: 35 G/DL (ref 32–36)
MCV RBC AUTO: 86 FL (ref 82–98)
METHADONE UR QL SCN>300 NG/ML: NEGATIVE
MICROSCOPIC COMMENT: NORMAL
MONOCYTES # BLD AUTO: 1.2 K/UL (ref 0.3–1)
MONOCYTES NFR BLD: 9.1 % (ref 4–15)
NEUTROPHILS # BLD AUTO: 8 K/UL (ref 1.8–7.7)
NEUTROPHILS NFR BLD: 60.4 % (ref 38–73)
NITRITE UR QL STRIP: NEGATIVE
NRBC BLD-RTO: 0 /100 WBC
OPIATES UR QL SCN: NEGATIVE
OSMOLALITY SERPL: 257 MOSM/KG (ref 280–300)
OSMOLALITY UR: 550 MOSM/KG (ref 50–1200)
PCP UR QL SCN>25 NG/ML: NEGATIVE
PH UR STRIP: 5 [PH] (ref 5–8)
PHOSPHATE SERPL-MCNC: 2.7 MG/DL (ref 2.7–4.5)
PLATELET # BLD AUTO: 212 K/UL (ref 150–450)
PMV BLD AUTO: 10.9 FL (ref 9.2–12.9)
POC IONIZED CALCIUM: 0.91 MMOL/L (ref 1.06–1.42)
POC TCO2 (MEASURED): 21 MMOL/L (ref 23–29)
POCT GLUCOSE: 143 MG/DL (ref 70–110)
POTASSIUM BLD-SCNC: 3.6 MMOL/L (ref 3.5–5.1)
POTASSIUM SERPL-SCNC: 3 MMOL/L (ref 3.5–5.1)
POTASSIUM SERPL-SCNC: 3.8 MMOL/L (ref 3.5–5.1)
PROT SERPL-MCNC: 6.7 G/DL (ref 6–8.4)
PROT UR QL STRIP: NEGATIVE
RBC # BLD AUTO: 4.27 M/UL (ref 4.6–6.2)
RBC #/AREA URNS AUTO: 1 /HPF (ref 0–4)
SAMPLE: ABNORMAL
SODIUM BLD-SCNC: 119 MMOL/L (ref 136–145)
SODIUM SERPL-SCNC: 121 MMOL/L (ref 136–145)
SODIUM SERPL-SCNC: 124 MMOL/L (ref 136–145)
SODIUM UR-SCNC: 94 MMOL/L (ref 20–250)
SP GR UR STRIP: 1.01 (ref 1–1.03)
TOXICOLOGY INFORMATION: NORMAL
TROPONIN I SERPL DL<=0.01 NG/ML-MCNC: <0.006 NG/ML (ref 0–0.03)
URN SPEC COLLECT METH UR: ABNORMAL
WBC # BLD AUTO: 13.29 K/UL (ref 3.9–12.7)
WBC #/AREA URNS AUTO: 2 /HPF (ref 0–5)

## 2022-06-25 PROCEDURE — 85025 COMPLETE CBC W/AUTO DIFF WBC: CPT | Performed by: PHYSICIAN ASSISTANT

## 2022-06-25 PROCEDURE — 99284 EMERGENCY DEPT VISIT MOD MDM: CPT | Mod: CS,,, | Performed by: EMERGENCY MEDICINE

## 2022-06-25 PROCEDURE — 83605 ASSAY OF LACTIC ACID: CPT | Performed by: PHYSICIAN ASSISTANT

## 2022-06-25 PROCEDURE — 87040 BLOOD CULTURE FOR BACTERIA: CPT | Performed by: PHYSICIAN ASSISTANT

## 2022-06-25 PROCEDURE — 99284 PR EMERGENCY DEPT VISIT,LEVEL IV: ICD-10-PCS | Mod: CS,,, | Performed by: EMERGENCY MEDICINE

## 2022-06-25 PROCEDURE — 83880 ASSAY OF NATRIURETIC PEPTIDE: CPT | Performed by: PHYSICIAN ASSISTANT

## 2022-06-25 PROCEDURE — 96361 HYDRATE IV INFUSION ADD-ON: CPT

## 2022-06-25 PROCEDURE — 82550 ASSAY OF CK (CPK): CPT | Performed by: PHYSICIAN ASSISTANT

## 2022-06-25 PROCEDURE — 93005 ELECTROCARDIOGRAM TRACING: CPT

## 2022-06-25 PROCEDURE — 81001 URINALYSIS AUTO W/SCOPE: CPT | Performed by: PHYSICIAN ASSISTANT

## 2022-06-25 PROCEDURE — 84484 ASSAY OF TROPONIN QUANT: CPT | Performed by: PHYSICIAN ASSISTANT

## 2022-06-25 PROCEDURE — 83930 ASSAY OF BLOOD OSMOLALITY: CPT | Performed by: PHYSICIAN ASSISTANT

## 2022-06-25 PROCEDURE — 93010 EKG 12-LEAD: ICD-10-PCS | Mod: ,,, | Performed by: INTERNAL MEDICINE

## 2022-06-25 PROCEDURE — 63600175 PHARM REV CODE 636 W HCPCS: Performed by: NURSE PRACTITIONER

## 2022-06-25 PROCEDURE — 80307 DRUG TEST PRSMV CHEM ANLYZR: CPT | Performed by: PHYSICIAN ASSISTANT

## 2022-06-25 PROCEDURE — 93010 ELECTROCARDIOGRAM REPORT: CPT | Mod: ,,, | Performed by: INTERNAL MEDICINE

## 2022-06-25 PROCEDURE — 80048 BASIC METABOLIC PNL TOTAL CA: CPT | Mod: 91,XB | Performed by: STUDENT IN AN ORGANIZED HEALTH CARE EDUCATION/TRAINING PROGRAM

## 2022-06-25 PROCEDURE — 82077 ASSAY SPEC XCP UR&BREATH IA: CPT | Performed by: PHYSICIAN ASSISTANT

## 2022-06-25 PROCEDURE — 99291 PR CRITICAL CARE, E/M 30-74 MINUTES: ICD-10-PCS | Mod: ,,, | Performed by: INTERNAL MEDICINE

## 2022-06-25 PROCEDURE — 82962 GLUCOSE BLOOD TEST: CPT

## 2022-06-25 PROCEDURE — 80047 BASIC METABLC PNL IONIZED CA: CPT

## 2022-06-25 PROCEDURE — 99291 CRITICAL CARE FIRST HOUR: CPT | Mod: ,,, | Performed by: INTERNAL MEDICINE

## 2022-06-25 PROCEDURE — 84100 ASSAY OF PHOSPHORUS: CPT | Performed by: PHYSICIAN ASSISTANT

## 2022-06-25 PROCEDURE — 51702 INSERT TEMP BLADDER CATH: CPT

## 2022-06-25 PROCEDURE — 80053 COMPREHEN METABOLIC PANEL: CPT | Performed by: PHYSICIAN ASSISTANT

## 2022-06-25 PROCEDURE — 84300 ASSAY OF URINE SODIUM: CPT | Performed by: EMERGENCY MEDICINE

## 2022-06-25 PROCEDURE — 99285 EMERGENCY DEPT VISIT HI MDM: CPT | Mod: 25

## 2022-06-25 PROCEDURE — 63600175 PHARM REV CODE 636 W HCPCS: Performed by: STUDENT IN AN ORGANIZED HEALTH CARE EDUCATION/TRAINING PROGRAM

## 2022-06-25 PROCEDURE — 83935 ASSAY OF URINE OSMOLALITY: CPT | Performed by: EMERGENCY MEDICINE

## 2022-06-25 PROCEDURE — 25000003 PHARM REV CODE 250: Performed by: EMERGENCY MEDICINE

## 2022-06-25 PROCEDURE — 83735 ASSAY OF MAGNESIUM: CPT | Performed by: PHYSICIAN ASSISTANT

## 2022-06-25 PROCEDURE — 20000000 HC ICU ROOM

## 2022-06-25 PROCEDURE — 63600175 PHARM REV CODE 636 W HCPCS: Performed by: INTERNAL MEDICINE

## 2022-06-25 PROCEDURE — 96360 HYDRATION IV INFUSION INIT: CPT

## 2022-06-25 RX ORDER — 3% SODIUM CHLORIDE 3 G/100ML
50 INJECTION, SOLUTION INTRAVENOUS CONTINUOUS
Status: DISPENSED | OUTPATIENT
Start: 2022-06-25 | End: 2022-06-25

## 2022-06-25 RX ORDER — 3% SODIUM CHLORIDE 3 G/100ML
50 INJECTION, SOLUTION INTRAVENOUS CONTINUOUS
Status: DISCONTINUED | OUTPATIENT
Start: 2022-06-25 | End: 2022-06-25

## 2022-06-25 RX ORDER — 3% SODIUM CHLORIDE 3 G/100ML
50 INJECTION, SOLUTION INTRAVENOUS CONTINUOUS
Status: DISPENSED | OUTPATIENT
Start: 2022-06-25 | End: 2022-06-26

## 2022-06-25 RX ORDER — ONDANSETRON 2 MG/ML
4 INJECTION INTRAMUSCULAR; INTRAVENOUS
Status: ACTIVE | OUTPATIENT
Start: 2022-06-25 | End: 2022-06-26

## 2022-06-25 RX ORDER — SODIUM CHLORIDE 0.9 % (FLUSH) 0.9 %
10 SYRINGE (ML) INJECTION
Status: DISCONTINUED | OUTPATIENT
Start: 2022-06-25 | End: 2022-06-27 | Stop reason: HOSPADM

## 2022-06-25 RX ADMIN — SODIUM CHLORIDE 50 ML/HR: 3 INJECTION, SOLUTION INTRAVENOUS at 08:06

## 2022-06-25 RX ADMIN — SODIUM CHLORIDE 50 ML/HR: 3 INJECTION, SOLUTION INTRAVENOUS at 06:06

## 2022-06-25 RX ADMIN — POTASSIUM BICARBONATE 25 MEQ: 978 TABLET, EFFERVESCENT ORAL at 06:06

## 2022-06-25 RX ADMIN — SODIUM CHLORIDE 1000 ML: 0.9 INJECTION, SOLUTION INTRAVENOUS at 02:06

## 2022-06-25 RX ADMIN — SODIUM CHLORIDE 500 ML: 0.9 INJECTION, SOLUTION INTRAVENOUS at 02:06

## 2022-06-25 RX ADMIN — SODIUM CHLORIDE 50 ML/HR: 3 INJECTION, SOLUTION INTRAVENOUS at 10:06

## 2022-06-25 NOTE — ED TRIAGE NOTES
Nicanor Solis, a 36 y.o. male presents to the ED w/ complaint of diaphoresis. Pt arrives to ED by personal vehicle. States that approx 1 hour ago, became feeling weak, dizzy, and as though he may faint. Stated that felt constipated this morning, drank 1 gallon of water to assist w/ loosening bowel movements. Wife reports that he became extremely diaphoretic, and witnessed  seizure like activity in car on way here. Pt is lethargic, clammy, pale diaphoretic, lethargic on arrival. AAO x4. Denies any recent illness, chest pain, SOB. Daughter had suspected RSV last week. No significant PMH.     Triage note:  Chief Complaint   Patient presents with    Seizures     Pt arrives weak and diaphoretic. Pt states he drank approximately 1 gallon of water because he was constipated.      Review of patient's allergies indicates:   Allergen Reactions    Penicillins Other (See Comments)     Pt was testing at birth     Past Medical History:   Diagnosis Date    ADHD (attention deficit hyperactivity disorder)

## 2022-06-25 NOTE — ASSESSMENT & PLAN NOTE
Leukocytosis  Lactic acidosis    Patient with sodium of 119 on ISTAT notable for worsening mental status, increased nausea, vomiting, and seizure-like activity following consumption of up to 1 gallon of water. Suspected recent viral illness. Elevated lactate and WBC likely related to seizure. Patient received 500 ml IVF in ER.    PLAN:  · 50 ml/hr hypertonic (3%) saline for 1 hour  · Bolus 50 ml hypertonic saline as needed for seizure-like activity  · BMP q2  · Seizure precautions  · Strict intake and outputs: place Escamilla catheter given his confusion and acuity of condition

## 2022-06-25 NOTE — H&P
[facility] - Emergency Dept  Critical Care Medicine  History & Physical    Patient Name: Nicanor Solis  MRN: [id number]  Admission Date: 6/25/2022  Hospital Length of Stay: 0 days  Code Status: Full Code  Attending Physician: Brian Chandler MD   Primary Care Provider: Ford Barrera MD   Principal Problem: <principal problem not specified>    Subjective:     HPI:  Nicanor Solis is a previously-healthy 36 y.o. male who presented to [facility] ED on 6/25/22 with AMS, seizure-like activity and diaphoresis.  History is provided by his wife at bedside, who says they have been having a viral respiratory illness (RSV suspected) going around at home.  She states that approximately 1 hour prior to presentation he became feeling weak, dizzy, and as though he may faint. She says that he felt constipated this morning, so he drank 1 gallon of water to assist w/ loosening bowel movements. She says that he became acutely extremely diaphoretic and started speaking nonsense as well as asking for help.  She also says she witnessed a seizure-like activity in passenger seat of car on way here: he had a brief full-body spasm but she says he did not pass out.    Denies any recent illness, chest pain, SOB prior to presentation.    Pt was noted to be lethargic, clammy, pale diaphoretic, lethargic on ED arrival.  Afebrile, hypertensive, RR 15 and satting 96 on RA.  ISTAT CHEM8: Na 119.  WBC 13.19.  He was given a  mL bolus.  On our interview, he remained hypertensive but was tachypneic, tremulous, confused.  He was admitted to the MICU for further evaluation and management.      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 Negative for fever.   HENT:  Positive for congestion and postnasal drip.    Respiratory:  Positive for cough, shortness of breath (started day of presentation) and wheezing.    Cardiovascular:  Negative for chest pain.   Genitourinary:  Negative for difficulty urinating.   Objective:     Vital Signs (Most Recent):  Temp: 98.2 °F (36.8 °C) (06/25/22 1341)  Pulse: 81 (06/25/22 1442)  Resp: (!) 23 (06/25/22 1442)  BP: (!) 164/79 (06/25/22 1442)  SpO2: 100 % (06/25/22 1442)   Vital Signs (24h Range):  Temp:  [98.2 °F (36.8 °C)] 98.2 °F (36.8 °C)  Pulse:  [81-89] 81  Resp:  [15-23] 23  SpO2:  [96 %-100 %] 100 %  BP: (164-179)/(79-86) 164/79   Weight: 85.7 kg (189 lb)  Body mass index is 29.6 kg/m².    No intake or output data in the 24 hours ending 06/25/22 1602    Physical Exam  Constitutional:       General: He is in acute distress.      Appearance: He is normal weight. He is ill-appearing.   HENT:      Head: Normocephalic and atraumatic.      Right Ear: External ear normal.      Left Ear: External ear normal.      Nose: Nose normal.      Mouth/Throat:      Mouth: Mucous membranes are moist.      Pharynx: Oropharynx is clear.   Eyes:      Extraocular Movements: Extraocular movements intact.      Conjunctiva/sclera: Conjunctivae normal.   Cardiovascular:      Rate and Rhythm: Normal rate and regular rhythm.      Pulses: Normal pulses.      Heart sounds: Normal heart sounds.   Pulmonary:      Breath sounds: Normal breath sounds.   Abdominal:       General: Abdomen is flat. Bowel sounds are normal. There is no distension.      Tenderness: There is no abdominal tenderness. There is no guarding or rebound.   Musculoskeletal:         General: Normal range of motion.      Cervical back: Normal range of motion and neck supple. No rigidity.   Skin:     General: Skin is warm and dry.   Neurological:      Mental Status: He is alert.       Vents:     Lines/Drains/Airways       Peripheral Intravenous Line  Duration                  Peripheral IV - Single Lumen 06/25/22 1406 20 G Left Hand <1 day                  Significant Labs:    CBC/Anemia Profile:  Recent Labs   Lab 06/25/22  1404 06/25/22  1409   WBC  --  13.29*   HGB  --  12.8*   HCT 40 36.6*   PLT  --  212   MCV  --  86   RDW  --  12.2        Chemistries:  No results for input(s): NA, K, CL, CO2, BUN, CREATININE, CALCIUM, ALBUMIN, PROT, BILITOT, ALKPHOS, ALT, AST, GLUCOSE, MG, PHOS in the last 48 hours.    ABGs: No results for input(s): PH, PCO2, HCO3, POCSATURATED, BE in the last 48 hours.  CMP: No results for input(s): NA, K, CL, CO2, GLU, BUN, CREATININE, CALCIUM, PROT, ALBUMIN, BILITOT, ALKPHOS, AST, ALT, ANIONGAP, EGFRNONAA in the last 48 hours.    Invalid input(s): ESTGFAFR[place]  Lactic Acid:   Recent Labs   Lab 06/25/22  1409   LACTATE 4.0*     Troponin:   Recent Labs   Lab 06/25/22  1409   TROPONINI <0.006     All pertinent labs within the past 24 hours have been reviewed.    Significant Imaging: I have reviewed all pertinent imaging results/findings within the past 24 hours.  CXR: I have reviewed all pertinent results/findings within the past 24 hours and my personal findings are:  No concerning findings    Assessment/Plan:     Renal/  Hyponatremia  Leukocytosis  Lactic acidosis    Patient with sodium of 119 on ISTAT notable for worsening mental status, increased nausea, vomiting, and seizure-like activity following consumption of up to 1 gallon of water. Suspected recent viral illness. Elevated lactate  and WBC likely related to seizure. Patient received 500 ml IVF in ER.    PLAN:  · 50 ml/hr hypertonic (3%) saline for 1 hour  · Bolus 50 ml hypertonic saline as needed for seizure-like activity  · BMP q2  · Seizure precautions  · Strict intake and outputs: place Escamilla catheter given his confusion and acuity of condition       Critical secondary to Patient has a condition that poses threat to life and bodily function: Symptomatic hyponatremia     Critical care was time spent personally by me on the following activities: development of treatment plan with patient or surrogate and bedside caregivers, discussions with consultants, evaluation of patient's response to treatment, examination of patient, ordering and performing treatments and interventions, ordering and review of laboratory studies, ordering and review of radiographic studies, pulse oximetry, re-evaluation of patient's condition. This critical care time did not overlap with that of any other provider or involve time for any procedures.     Erich Jackson MD  Critical Care Medicine  [facility] - Emergency Dept

## 2022-06-25 NOTE — SUBJECTIVE & OBJECTIVE
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 Negative for fever.   HENT:  Positive for congestion and postnasal drip.    Respiratory:  Positive for cough, shortness of breath (started day of presentation) and wheezing.    Cardiovascular:  Negative for chest pain.   Genitourinary:  Negative for difficulty urinating.   Objective:     Vital Signs (Most Recent):  Temp: 98.2 °F (36.8 °C) (06/25/22 1341)  Pulse: 81 (06/25/22 1442)  Resp: (!) 23 (06/25/22 1442)  BP: (!) 164/79 (06/25/22 1442)  SpO2: 100 % (06/25/22 1442)   Vital Signs (24h Range):  Temp:  [98.2 °F (36.8 °C)] 98.2 °F (36.8 °C)  Pulse:  [81-89] 81  Resp:  [15-23] 23  SpO2:  [96 %-100 %] 100 %  BP: (164-179)/(79-86) 164/79   Weight: 85.7 kg (189 lb)  Body mass index is 29.6 kg/m².    No intake or output data in the 24 hours ending 06/25/22 1602    Physical Exam  Constitutional:       General: He is in acute distress.      Appearance: He is normal weight. He is ill-appearing.   HENT:      Head: Normocephalic and atraumatic.      Right Ear: External ear normal.      Left Ear: External ear normal.      Nose: Nose normal.      Mouth/Throat:      Mouth: Mucous membranes are moist.      Pharynx: Oropharynx is clear.   Eyes:       Extraocular Movements: Extraocular movements intact.      Conjunctiva/sclera: Conjunctivae normal.   Cardiovascular:      Rate and Rhythm: Normal rate and regular rhythm.      Pulses: Normal pulses.      Heart sounds: Normal heart sounds.   Pulmonary:      Breath sounds: Normal breath sounds.   Abdominal:      General: Abdomen is flat. Bowel sounds are normal. There is no distension.      Tenderness: There is no abdominal tenderness. There is no guarding or rebound.   Musculoskeletal:         General: Normal range of motion.      Cervical back: Normal range of motion and neck supple. No rigidity.   Skin:     General: Skin is warm and dry.   Neurological:      Mental Status: He is alert.       Vents:     Lines/Drains/Airways       Peripheral Intravenous Line  Duration                  Peripheral IV - Single Lumen 06/25/22 1406 20 G Left Hand <1 day                  Significant Labs:    CBC/Anemia Profile:  Recent Labs   Lab 06/25/22  1404 06/25/22  1409   WBC  --  13.29*   HGB  --  12.8*   HCT 40 36.6*   PLT  --  212   MCV  --  86   RDW  --  12.2        Chemistries:  No results for input(s): NA, K, CL, CO2, BUN, CREATININE, CALCIUM, ALBUMIN, PROT, BILITOT, ALKPHOS, ALT, AST, GLUCOSE, MG, PHOS in the last 48 hours.    ABGs: No results for input(s): PH, PCO2, HCO3, POCSATURATED, BE in the last 48 hours.  CMP: No results for input(s): NA, K, CL, CO2, GLU, BUN, CREATININE, CALCIUM, PROT, ALBUMIN, BILITOT, ALKPHOS, AST, ALT, ANIONGAP, EGFRNONAA in the last 48 hours.    Invalid input(s): ESTGFAFR[place]  Lactic Acid:   Recent Labs   Lab 06/25/22  1409   LACTATE 4.0*     Troponin:   Recent Labs   Lab 06/25/22  1409   TROPONINI <0.006     All pertinent labs within the past 24 hours have been reviewed.    Significant Imaging: I have reviewed all pertinent imaging results/findings within the past 24 hours.  CXR: I have reviewed all pertinent results/findings within the past 24 hours and my personal findings are:  No concerning  findings

## 2022-06-25 NOTE — HPI
Nicanor Solis is a previously-healthy 36 y.o. male who presented to [facility] ED on 6/25/22 with AMS, seizure-like activity and diaphoresis.  History is provided by his wife at bedside, who says they have been having a viral respiratory illness (RSV suspected) going around at home.  She states that approximately 1 hour prior to presentation he became feeling weak, dizzy, and as though he may faint. She says that he felt constipated this morning, so he drank 1 gallon of water to assist w/ loosening bowel movements. She says that he became acutely extremely diaphoretic and started speaking nonsense as well as asking for help.  She also says she witnessed a seizure-like activity in passenger seat of car on way here: he had a brief full-body spasm but she says he did not pass out.    Denies any recent illness, chest pain, SOB prior to presentation.    Pt was noted to be lethargic, clammy, pale diaphoretic, lethargic on ED arrival.  Afebrile, hypertensive, RR 15 and satting 96 on RA.  ISTAT CHEM8: Na 119.  WBC 13.19.  He was given a  mL bolus.  On our interview, he remained hypertensive but was tachypneic, tremulous, confused.  He was admitted to the MICU for further evaluation and management.  
What Is The Reason For Today's Visit?: Full Body Skin Examination
What Is The Reason For Today's Visit? (Being Monitored For X): concerning skin lesions on an annual basis
How Severe Are Your Spot(S)?: moderate

## 2022-06-26 PROBLEM — K59.00 CONSTIPATION: Status: ACTIVE | Noted: 2022-06-26

## 2022-06-26 LAB
ANION GAP SERPL CALC-SCNC: 10 MMOL/L (ref 8–16)
ANION GAP SERPL CALC-SCNC: 10 MMOL/L (ref 8–16)
ANION GAP SERPL CALC-SCNC: 11 MMOL/L (ref 8–16)
ANION GAP SERPL CALC-SCNC: 11 MMOL/L (ref 8–16)
ANION GAP SERPL CALC-SCNC: 13 MMOL/L (ref 8–16)
ANION GAP SERPL CALC-SCNC: 7 MMOL/L (ref 8–16)
ANION GAP SERPL CALC-SCNC: 9 MMOL/L (ref 8–16)
ANION GAP SERPL CALC-SCNC: 9 MMOL/L (ref 8–16)
BASOPHILS # BLD AUTO: 0.01 K/UL (ref 0–0.2)
BASOPHILS NFR BLD: 0.2 % (ref 0–1.9)
BUN SERPL-MCNC: 5 MG/DL (ref 6–20)
BUN SERPL-MCNC: 5 MG/DL (ref 6–20)
BUN SERPL-MCNC: 6 MG/DL (ref 6–20)
BUN SERPL-MCNC: 8 MG/DL (ref 6–20)
CALCIUM SERPL-MCNC: 8.6 MG/DL (ref 8.7–10.5)
CALCIUM SERPL-MCNC: 8.8 MG/DL (ref 8.7–10.5)
CALCIUM SERPL-MCNC: 8.9 MG/DL (ref 8.7–10.5)
CALCIUM SERPL-MCNC: 9.1 MG/DL (ref 8.7–10.5)
CALCIUM SERPL-MCNC: 9.1 MG/DL (ref 8.7–10.5)
CHLORIDE SERPL-SCNC: 101 MMOL/L (ref 95–110)
CHLORIDE SERPL-SCNC: 102 MMOL/L (ref 95–110)
CHLORIDE SERPL-SCNC: 103 MMOL/L (ref 95–110)
CHLORIDE SERPL-SCNC: 103 MMOL/L (ref 95–110)
CHLORIDE SERPL-SCNC: 96 MMOL/L (ref 95–110)
CO2 SERPL-SCNC: 20 MMOL/L (ref 23–29)
CO2 SERPL-SCNC: 22 MMOL/L (ref 23–29)
CO2 SERPL-SCNC: 22 MMOL/L (ref 23–29)
CO2 SERPL-SCNC: 23 MMOL/L (ref 23–29)
CO2 SERPL-SCNC: 24 MMOL/L (ref 23–29)
CO2 SERPL-SCNC: 25 MMOL/L (ref 23–29)
CREAT SERPL-MCNC: 0.7 MG/DL (ref 0.5–1.4)
CREAT SERPL-MCNC: 0.8 MG/DL (ref 0.5–1.4)
CREAT SERPL-MCNC: 0.8 MG/DL (ref 0.5–1.4)
CREAT SERPL-MCNC: 0.9 MG/DL (ref 0.5–1.4)
CREAT SERPL-MCNC: 0.9 MG/DL (ref 0.5–1.4)
DIFFERENTIAL METHOD: ABNORMAL
EOSINOPHIL # BLD AUTO: 0.1 K/UL (ref 0–0.5)
EOSINOPHIL NFR BLD: 1.1 % (ref 0–8)
ERYTHROCYTE [DISTWIDTH] IN BLOOD BY AUTOMATED COUNT: 12 % (ref 11.5–14.5)
EST. GFR  (AFRICAN AMERICAN): >60 ML/MIN/1.73 M^2
EST. GFR  (NON AFRICAN AMERICAN): >60 ML/MIN/1.73 M^2
GLUCOSE SERPL-MCNC: 101 MG/DL (ref 70–110)
GLUCOSE SERPL-MCNC: 102 MG/DL (ref 70–110)
GLUCOSE SERPL-MCNC: 107 MG/DL (ref 70–110)
GLUCOSE SERPL-MCNC: 109 MG/DL (ref 70–110)
GLUCOSE SERPL-MCNC: 116 MG/DL (ref 70–110)
GLUCOSE SERPL-MCNC: 147 MG/DL (ref 70–110)
GLUCOSE SERPL-MCNC: 93 MG/DL (ref 70–110)
GLUCOSE SERPL-MCNC: 99 MG/DL (ref 70–110)
HCT VFR BLD AUTO: 38.9 % (ref 40–54)
HGB BLD-MCNC: 13.7 G/DL (ref 14–18)
IMM GRANULOCYTES # BLD AUTO: 0.01 K/UL (ref 0–0.04)
IMM GRANULOCYTES NFR BLD AUTO: 0.2 % (ref 0–0.5)
LYMPHOCYTES # BLD AUTO: 1.1 K/UL (ref 1–4.8)
LYMPHOCYTES NFR BLD: 18.3 % (ref 18–48)
MAGNESIUM SERPL-MCNC: 2 MG/DL (ref 1.6–2.6)
MCH RBC QN AUTO: 29.7 PG (ref 27–31)
MCHC RBC AUTO-ENTMCNC: 35.2 G/DL (ref 32–36)
MCV RBC AUTO: 84 FL (ref 82–98)
MONOCYTES # BLD AUTO: 0.7 K/UL (ref 0.3–1)
MONOCYTES NFR BLD: 11.6 % (ref 4–15)
NEUTROPHILS # BLD AUTO: 4.2 K/UL (ref 1.8–7.7)
NEUTROPHILS NFR BLD: 68.6 % (ref 38–73)
NRBC BLD-RTO: 0 /100 WBC
PHOSPHATE SERPL-MCNC: 3.6 MG/DL (ref 2.7–4.5)
PLATELET # BLD AUTO: 194 K/UL (ref 150–450)
PMV BLD AUTO: 10.6 FL (ref 9.2–12.9)
POTASSIUM SERPL-SCNC: 3.8 MMOL/L (ref 3.5–5.1)
POTASSIUM SERPL-SCNC: 3.9 MMOL/L (ref 3.5–5.1)
POTASSIUM SERPL-SCNC: 4 MMOL/L (ref 3.5–5.1)
POTASSIUM SERPL-SCNC: 4.2 MMOL/L (ref 3.5–5.1)
POTASSIUM SERPL-SCNC: 4.3 MMOL/L (ref 3.5–5.1)
POTASSIUM SERPL-SCNC: 4.4 MMOL/L (ref 3.5–5.1)
POTASSIUM SERPL-SCNC: 4.5 MMOL/L (ref 3.5–5.1)
POTASSIUM SERPL-SCNC: 4.5 MMOL/L (ref 3.5–5.1)
RBC # BLD AUTO: 4.61 M/UL (ref 4.6–6.2)
SARS-COV-2 RNA RESP QL NAA+PROBE: NOT DETECTED
SODIUM SERPL-SCNC: 129 MMOL/L (ref 136–145)
SODIUM SERPL-SCNC: 134 MMOL/L (ref 136–145)
SODIUM SERPL-SCNC: 134 MMOL/L (ref 136–145)
SODIUM SERPL-SCNC: 135 MMOL/L (ref 136–145)
SODIUM SERPL-SCNC: 135 MMOL/L (ref 136–145)
SODIUM SERPL-SCNC: 136 MMOL/L (ref 136–145)
WBC # BLD AUTO: 6.12 K/UL (ref 3.9–12.7)

## 2022-06-26 PROCEDURE — 80048 BASIC METABOLIC PNL TOTAL CA: CPT | Mod: 91 | Performed by: STUDENT IN AN ORGANIZED HEALTH CARE EDUCATION/TRAINING PROGRAM

## 2022-06-26 PROCEDURE — 63600175 PHARM REV CODE 636 W HCPCS: Mod: JG | Performed by: NURSE PRACTITIONER

## 2022-06-26 PROCEDURE — 25000003 PHARM REV CODE 250: Performed by: NURSE PRACTITIONER

## 2022-06-26 PROCEDURE — 83735 ASSAY OF MAGNESIUM: CPT | Performed by: STUDENT IN AN ORGANIZED HEALTH CARE EDUCATION/TRAINING PROGRAM

## 2022-06-26 PROCEDURE — 85025 COMPLETE CBC W/AUTO DIFF WBC: CPT | Performed by: STUDENT IN AN ORGANIZED HEALTH CARE EDUCATION/TRAINING PROGRAM

## 2022-06-26 PROCEDURE — U0005 INFEC AGEN DETEC AMPLI PROBE: HCPCS | Performed by: NURSE PRACTITIONER

## 2022-06-26 PROCEDURE — 84100 ASSAY OF PHOSPHORUS: CPT | Performed by: STUDENT IN AN ORGANIZED HEALTH CARE EDUCATION/TRAINING PROGRAM

## 2022-06-26 PROCEDURE — 99233 SBSQ HOSP IP/OBS HIGH 50: CPT | Mod: ,,, | Performed by: INTERNAL MEDICINE

## 2022-06-26 PROCEDURE — U0003 INFECTIOUS AGENT DETECTION BY NUCLEIC ACID (DNA OR RNA); SEVERE ACUTE RESPIRATORY SYNDROME CORONAVIRUS 2 (SARS-COV-2) (CORONAVIRUS DISEASE [COVID-19]), AMPLIFIED PROBE TECHNIQUE, MAKING USE OF HIGH THROUGHPUT TECHNOLOGIES AS DESCRIBED BY CMS-2020-01-R: HCPCS | Performed by: NURSE PRACTITIONER

## 2022-06-26 PROCEDURE — 99233 PR SUBSEQUENT HOSPITAL CARE,LEVL III: ICD-10-PCS | Mod: ,,, | Performed by: INTERNAL MEDICINE

## 2022-06-26 PROCEDURE — 25000003 PHARM REV CODE 250: Performed by: INTERNAL MEDICINE

## 2022-06-26 PROCEDURE — 80048 BASIC METABOLIC PNL TOTAL CA: CPT | Mod: 91

## 2022-06-26 PROCEDURE — 20600001 HC STEP DOWN PRIVATE ROOM

## 2022-06-26 PROCEDURE — 25000003 PHARM REV CODE 250

## 2022-06-26 RX ORDER — POLYETHYLENE GLYCOL 3350 17 G/17G
17 POWDER, FOR SOLUTION ORAL DAILY
Status: DISCONTINUED | OUTPATIENT
Start: 2022-06-26 | End: 2022-06-27 | Stop reason: HOSPADM

## 2022-06-26 RX ORDER — DESMOPRESSIN ACETATE 4 UG/ML
2 INJECTION, SOLUTION INTRAVENOUS; SUBCUTANEOUS ONCE
Status: DISCONTINUED | OUTPATIENT
Start: 2022-06-26 | End: 2022-06-26

## 2022-06-26 RX ORDER — LIDOCAINE 50 MG/G
1 PATCH TOPICAL
Status: DISCONTINUED | OUTPATIENT
Start: 2022-06-26 | End: 2022-06-27 | Stop reason: HOSPADM

## 2022-06-26 RX ORDER — DEXTROSE MONOHYDRATE 50 MG/ML
INJECTION, SOLUTION INTRAVENOUS CONTINUOUS
Status: DISCONTINUED | OUTPATIENT
Start: 2022-06-26 | End: 2022-06-27

## 2022-06-26 RX ORDER — IBUPROFEN 400 MG/1
800 TABLET ORAL EVERY 6 HOURS PRN
Status: DISCONTINUED | OUTPATIENT
Start: 2022-06-26 | End: 2022-06-27 | Stop reason: HOSPADM

## 2022-06-26 RX ORDER — MUPIROCIN 20 MG/G
OINTMENT TOPICAL 2 TIMES DAILY
Status: DISCONTINUED | OUTPATIENT
Start: 2022-06-26 | End: 2022-06-27 | Stop reason: HOSPADM

## 2022-06-26 RX ORDER — LIDOCAINE 50 MG/G
1 PATCH TOPICAL DAILY PRN
Qty: 30 PATCH | Refills: 1 | Status: SHIPPED | OUTPATIENT
Start: 2022-06-26

## 2022-06-26 RX ORDER — IBUPROFEN 400 MG/1
800 TABLET ORAL EVERY 6 HOURS PRN
Status: DISCONTINUED | OUTPATIENT
Start: 2022-06-26 | End: 2022-06-26

## 2022-06-26 RX ORDER — POLYETHYLENE GLYCOL 3350 17 G/17G
17 POWDER, FOR SOLUTION ORAL DAILY PRN
Qty: 510 G | Refills: 1 | Status: SHIPPED | OUTPATIENT
Start: 2022-06-26

## 2022-06-26 RX ORDER — DESMOPRESSIN ACETATE 4 UG/ML
1 INJECTION, SOLUTION INTRAVENOUS; SUBCUTANEOUS ONCE
Status: COMPLETED | OUTPATIENT
Start: 2022-06-26 | End: 2022-06-26

## 2022-06-26 RX ADMIN — MUPIROCIN: 20 OINTMENT TOPICAL at 09:06

## 2022-06-26 RX ADMIN — DEXTROSE MONOHYDRATE: 5 INJECTION, SOLUTION INTRAVENOUS at 09:06

## 2022-06-26 RX ADMIN — LIDOCAINE 1 PATCH: 50 PATCH CUTANEOUS at 10:06

## 2022-06-26 RX ADMIN — POLYETHYLENE GLYCOL 3350 17 G: 17 POWDER, FOR SOLUTION ORAL at 10:06

## 2022-06-26 RX ADMIN — DEXTROSE MONOHYDRATE: 5 INJECTION, SOLUTION INTRAVENOUS at 06:06

## 2022-06-26 RX ADMIN — DESMOPRESSIN ACETATE 1 MCG: 4 SOLUTION INTRAVENOUS at 06:06

## 2022-06-26 RX ADMIN — IBUPROFEN 800 MG: 400 TABLET, FILM COATED ORAL at 05:06

## 2022-06-26 NOTE — SUBJECTIVE & OBJECTIVE
Interval History/Significant Events: NAEON. See hospital course.      Review of Systems   Constitutional:  Negative for chills and fever.   HENT:  Positive for postnasal drip and sneezing. Negative for sore throat.    Eyes:  Negative for pain.   Respiratory:  Negative for cough and shortness of breath.    Cardiovascular:  Negative for chest pain.   Gastrointestinal:  Positive for abdominal distention and constipation. Negative for abdominal pain, diarrhea, nausea and vomiting.   Genitourinary:  Negative for dysuria.   Musculoskeletal:  Positive for back pain.   Skin:  Negative for rash.   Neurological:  Negative for dizziness, weakness and headaches.   Objective:     Vital Signs (Most Recent):  Temp: 99.2 °F (37.3 °C) (06/26/22 0715)  Pulse: 76 (06/26/22 0900)  Resp: 19 (06/26/22 0900)  BP: 124/73 (06/26/22 0900)  SpO2: 95 % (06/26/22 0900) Vital Signs (24h Range):  Temp:  [97.5 °F (36.4 °C)-99.2 °F (37.3 °C)] 99.2 °F (37.3 °C)  Pulse:  [60-95] 76  Resp:  [11-27] 19  SpO2:  [95 %-100 %] 95 %  BP: (108-185)/(64-90) 124/73   Weight: 85.7 kg (189 lb)  Body mass index is 29.6 kg/m².      Intake/Output Summary (Last 24 hours) at 6/26/2022 1000  Last data filed at 6/26/2022 0804  Gross per 24 hour   Intake 1245.79 ml   Output 7400 ml   Net -6154.21 ml       Physical Exam  Constitutional:       Appearance: Normal appearance. He is normal weight. He is not ill-appearing.   HENT:      Head: Normocephalic and atraumatic.      Right Ear: External ear normal.      Left Ear: External ear normal.      Nose: Nose normal.      Mouth/Throat:      Mouth: Mucous membranes are moist.      Pharynx: Oropharynx is clear.   Eyes:      Extraocular Movements: Extraocular movements intact.      Conjunctiva/sclera: Conjunctivae normal.   Neck:      Vascular: No carotid bruit.   Cardiovascular:      Rate and Rhythm: Normal rate and regular rhythm.      Pulses: Normal pulses.      Heart sounds: Normal heart sounds.   Pulmonary:      Effort:  Pulmonary effort is normal.      Breath sounds: Normal breath sounds.   Abdominal:      General: Bowel sounds are normal. There is distension.      Tenderness: There is no abdominal tenderness. There is no guarding or rebound.   Musculoskeletal:         General: Normal range of motion.      Cervical back: Normal range of motion and neck supple.      Right lower leg: No edema.      Left lower leg: No edema.   Skin:     General: Skin is warm and dry.      Capillary Refill: Capillary refill takes less than 2 seconds.   Neurological:      General: No focal deficit present.      Mental Status: He is alert and oriented to person, place, and time. Mental status is at baseline.   Psychiatric:         Mood and Affect: Mood normal.         Behavior: Behavior normal.         Thought Content: Thought content normal.         Judgment: Judgment normal.       Vents:     Lines/Drains/Airways       Peripheral Intravenous Line  Duration                  Peripheral IV - Single Lumen 06/25/22 1406 20 G Left Hand <1 day         Peripheral IV - Single Lumen 06/25/22 1750 18 G Right Antecubital <1 day         Peripheral IV - Single Lumen 06/25/22 1751 20 G Left Antecubital <1 day                  Significant Labs:    CBC/Anemia Profile:  Recent Labs   Lab 06/25/22  1404 06/25/22  1409 06/26/22  0448   WBC  --  13.29* 6.12   HGB  --  12.8* 13.7*   HCT 40 36.6* 38.9*   PLT  --  212 194   MCV  --  86 84   RDW  --  12.2 12.0        Chemistries:  Recent Labs   Lab 06/25/22  1652 06/25/22  2052 06/26/22  0448 06/26/22  0632 06/26/22  0811   *   < > 135* 136 134*   K 3.0*   < > 4.3 4.5 4.4   CL 87*   < > 102 103 102   CO2 21*   < > 24 23 22*   BUN 9   < > 5* 6 6   CREATININE 0.7   < > 0.7 0.7 0.8   CALCIUM 8.1*   < > 8.8 8.8 9.1   ALBUMIN 4.0  --   --   --   --    PROT 6.7  --   --   --   --    BILITOT 1.0  --   --   --   --    ALKPHOS 53*  --   --   --   --    ALT 54*  --   --   --   --    AST 91*  --   --   --   --    MG 1.3*  --  2.0   --   --    PHOS 2.7  --  3.6  --   --     < > = values in this interval not displayed.       Lactic Acid:   Recent Labs   Lab 06/25/22  1409   LACTATE 4.0*         Significant Imaging:  I have reviewed all pertinent imaging results/findings within the past 24 hours.  CT: I have reviewed all pertinent results/findings within the past 24 hours and my personal findings are:  CTH WNL.  CT abdo with perinephric fat stranding and several diverticuli.

## 2022-06-26 NOTE — ED PROVIDER NOTES
"Encounter Date: 6/25/2022       History     Chief Complaint   Patient presents with    Seizures     Pt arrives weak and diaphoretic. Pt states he drank approximately 1 gallon of water because he was constipated.      35 y/o M with history of ADHD presents to the ED c/o acute diaphoresis. He has been constipated (last BM this morning). Due to this constipation, he drank "a lot" or water over the course of a few hours. He is unable or unwilling to pinpoint the exact amount, but states it is "probably more" than a gallon.  After drinking water, he had this feeling of impending doom and felt that he had done something wrong drinking that much water. He became nauseated, had an episode of emesis which improved his symptoms some. He then became diffusely diaphoretic. Wife at bedside states that while driving to the ED - he seemed confused, kept saying "doctor, help" then had an episode of shaking for approx 10 seconds. No LOC. After this, he seemed to be more coherent. He denies any prior history of similar episodes. He denies chest pain, abdominal pain, URI symptoms, dysuria.     The history is provided by the patient.     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 Negative for chills and fever.   HENT: Negative for congestion and sore " throat.    Respiratory: Negative for cough and shortness of breath.    Cardiovascular: Negative for chest pain.   Gastrointestinal: Positive for constipation, nausea and vomiting. Negative for abdominal pain and diarrhea.   Genitourinary: Negative for dysuria and hematuria.   Musculoskeletal: Negative for back pain.   Skin: Negative for rash.   Neurological: Negative for weakness, numbness and headaches.   Psychiatric/Behavioral: Positive for confusion.       Physical Exam     Initial Vitals [06/25/22 1341]   BP Pulse Resp Temp SpO2   (!) 179/86 89 15 98.2 °F (36.8 °C) 96 %      MAP       --         Physical Exam    Nursing note and vitals reviewed.  Constitutional: He appears well-developed and well-nourished. He is diaphoretic. He appears distressed.   HENT:   Head: Normocephalic and atraumatic.   Neck: Neck supple.   Normal range of motion.  Cardiovascular: Normal rate, regular rhythm and normal heart sounds. Exam reveals no gallop and no friction rub.    No murmur heard.  Pulmonary/Chest: Breath sounds normal. He has no wheezes. He has no rhonchi. He has no rales. He exhibits no tenderness.   Abdominal: Abdomen is soft. Bowel sounds are normal. There is no abdominal tenderness. There is no rebound and no guarding.   Musculoskeletal:         General: Normal range of motion.      Cervical back: Normal range of motion and neck supple.     Neurological: He is alert and oriented to person, place, and time.   Skin: Skin is warm. No rash noted. No erythema.   Psychiatric: His speech is normal. His mood appears anxious.   Flat affect         ED Course   Procedures  Labs Reviewed   CBC W/ AUTO DIFFERENTIAL - Abnormal; Notable for the following components:       Result Value    WBC 13.29 (*)     RBC 4.27 (*)     Hemoglobin 12.8 (*)     Hematocrit 36.6 (*)     Gran # (ANC) 8.0 (*)     Immature Grans (Abs) 0.07 (*)     Mono # 1.2 (*)     All other components within normal limits   LACTIC ACID, PLASMA - Abnormal; Notable for  the following components:    Lactate (Lactic Acid) 4.0 (*)     All other components within normal limits   URINALYSIS, REFLEX TO URINE CULTURE - Abnormal; Notable for the following components:    Ketones, UA 1+ (*)     All other components within normal limits    Narrative:     Specimen Source->Urine   OSMOLALITY, SERUM - Abnormal; Notable for the following components:    Osmolality 257 (*)     All other components within normal limits    Narrative:     add on osmolality per MD DANETTE ESPINOZA  06/25/2022  15:09   Amarilys Vera RN acknowledged and accepted results on test(s)   serum osmolality via secure chat.  by SCB 06/25/2022 15:55   MAGNESIUM - Abnormal; Notable for the following components:    Magnesium 1.3 (*)     All other components within normal limits   CK - Abnormal; Notable for the following components:    CPK 2854 (*)     All other components within normal limits   COMPREHENSIVE METABOLIC PANEL - Abnormal; Notable for the following components:    Sodium 121 (*)     Potassium 3.0 (*)     Chloride 87 (*)     CO2 21 (*)     Glucose 127 (*)     Calcium 8.1 (*)     Alkaline Phosphatase 53 (*)     AST 91 (*)     ALT 54 (*)     All other components within normal limits   POCT GLUCOSE - Abnormal; Notable for the following components:    POCT Glucose 143 (*)     All other components within normal limits   ISTAT PROCEDURE - Abnormal; Notable for the following components:    POC Glucose 148 (*)     POC Sodium 119 (*)     POC Chloride 85 (*)     POC TCO2 (MEASURED) 21 (*)     POC Ionized Calcium 0.91 (*)     All other components within normal limits   CULTURE, BLOOD   CULTURE, BLOOD   TROPONIN I   B-TYPE NATRIURETIC PEPTIDE   DRUG SCREEN PANEL, URINE EMERGENCY    Narrative:     Specimen Source->Urine   SODIUM, URINE, RANDOM    Narrative:     Specimen Source->Urine   CREATININE, URINE, RANDOM    Narrative:     Specimen Source->Urine   OSMOLALITY, URINE RANDOM    Narrative:     Specimen Source->Urine   OSMOLALITY,  SERUM   PHOSPHORUS   ALCOHOL,MEDICAL (ETHANOL)   MAGNESIUM   URINALYSIS MICROSCOPIC    Narrative:     Specimen Source->Urine   BASIC METABOLIC PANEL   BASIC METABOLIC PANEL   BASIC METABOLIC PANEL   BASIC METABOLIC PANEL   SARS-COV-2 RDRP GENE   ISTAT CHEM8     EKG Readings: (Independently Interpreted)   Initial Reading: No STEMI. Rhythm: Normal Sinus Rhythm. Heart Rate: 85. Ectopy: No Ectopy. Clinical Impression: Normal Sinus Rhythm   qtc [id number]       Imaging Results          CT Abdomen Pelvis  Without Contrast (Final result)  Result time 06/25/22 18:33:33    Final result by Tremaine Reyez MD (06/25/22 18:33:33)                 Impression:      1. Bilateral perinephric fat stranding noting tethering of the lower poles.  Given that there is mild urinary bladder wall thickening, correlation with urinalysis is advised to exclude cystitis/ascending infection.  Also note there is residual urine within the urinary bladder despite Escamilla catheter, correlation with catheter function advised.  2. Small hiatal hernia.  There is a small amount of fluid in the distal esophagus could reflect sequela of gastroesophageal reflux.  3. Please see above for additional findings.      Electronically signed by: Tremaine Reyez MD  Date:    06/25/2022  Time:    18:33             Narrative:    EXAMINATION:  CT ABDOMEN PELVIS WITHOUT CONTRAST    CLINICAL HISTORY:  Abdominal pain, acute, nonlocalized;    TECHNIQUE:  Low dose axial images, sagittal and coronal reformations were obtained from the lung bases to the pubic symphysis.  Oral contrast was not administered.    COMPARISON:  None    FINDINGS:  Images of the lower thorax are remarkable for bilateral dependent atelectasis.    Allowing for motion artifact, the liver, spleen, pancreas, gallbladder and adrenal glands have a grossly unremarkable noncontrast appearance.  There is no biliary dilation or ascites.  The stomach is non distended noting small hiatal hernia.  No significant  abdominal lymphadenopathy.    There is bilateral perinephric fat stranding.  There is tethering of the lower poles of the kidneys.  There is mild prominence of the left renal collecting system.  The bilateral ureters are unremarkable without calculi seen.  The urinary bladder is decompressed around a Escaimlla catheter noting residual urine in the urinary bladder could reflect Escamilla catheter dysfunction.  There is wall thickening of the urinary bladder.  The prostate is not enlarged.    There are a few scattered colonic diverticula without inflammation.  The terminal ileum and appendix are unremarkable.  The small bowel is grossly unremarkable.  Several scattered shotty periaortic and paracaval lymph nodes are noted.  No focal organized pelvic fluid collection.    Degenerative changes are noted of the spine.  No significant inguinal lymphadenopathy.                               CT Head Without Contrast (Final result)  Result time 06/25/22 19:19:24    Final result by Willy Reynoso MD (06/25/22 19:19:24)                 Impression:      No acute intracranial abnormality.  Additional evaluation with MRI of the brain, as clinically warranted.    Paranasal sinus disease.    Electronically signed by resident: Trey Blevins  Date:    06/25/2022  Time:    18:30    Electronically signed by: Willy Reynoso MD  Date:    06/25/2022  Time:    19:19             Narrative:    EXAMINATION:  CT HEAD WITHOUT CONTRAST    CLINICAL HISTORY:  Mental status change, unknown cause;    TECHNIQUE:  Low dose axial CT images obtained throughout the head without intravenous contrast. Sagittal and coronal reconstructions were performed.    COMPARISON:  None    FINDINGS:  Ventricles are normal size without evidence of hydrocephalus.    Brain parenchyma is normal appearance without evidence of mass, hemorrhage, edema or major vascular distribution infarct.  No evidence of midline shift.  No extra-axial blood or fluid collections identified.    Calvarium is  intact without evidence of fracture.    Mastoid air cells are clear.  There is ethmoid sinus disease.  There is also mucosal thickening within the bilateral maxillary sinuses.                               X-Ray Chest AP Portable (Final result)  Result time 06/25/22 14:55:57    Final result by Tremaine Reyez MD (06/25/22 14:55:57)                 Impression:      1. Pulmonary findings may reflect edema noting shallow inspiratory effort.  No large focal consolidation.      Electronically signed by: Tremaine Reyez MD  Date:    06/25/2022  Time:    14:55             Narrative:    EXAMINATION:  XR CHEST AP PORTABLE    CLINICAL HISTORY:  Altered mental status, unspecified    TECHNIQUE:  Single frontal view of the chest was performed.    COMPARISON:  None    FINDINGS:  The cardiomediastinal silhouette is prominent, magnified by technique.  There is no pleural effusion.  The trachea is midline.  The lungs are symmetrically expanded bilaterally with coarse interstitial attenuation bilaterally.  There is left basilar subsegmental atelectasis..  No large focal consolidation seen.  There is no pneumothorax.  The osseous structures are unremarkable.                                 Medications   ondansetron injection 4 mg (4 mg Intravenous Not Given 6/25/22 1415)   sodium chloride 0.9% flush 10 mL (has no administration in time range)   sodium chloride 3% HYPERTONIC solution (50 mL/hr Intravenous New Bag 6/25/22 1850)   sodium chloride 3% HYPERTONIC solution (has no administration in time range)   sodium chloride 0.9% bolus 500 mL (0 mLs Intravenous Stopped 6/25/22 1611)   potassium bicarbonate disintegrating tablet 25 mEq (25 mEq Oral Given 6/25/22 1849)           APC / Resident Notes:   35 y/o M with history of ADHD presents to the ED c/o acute diaphoresis. Hypertensive. Diffusely diaphoretic. Abdomen nontender. No chest tenderness. Flat affect. Intermittent shaking, improves when you talk to him or ask questions. DDx  includes but is not limited to electrolyte abnormality, ACS, rhabdo, UTI, sepsis, SBO, dehydration, CHASE, intoxication, hypoglycemia. Will get labs, CXR, CT abdomen/pelvis, CT head.      UA with no infection. UDS negative. CPK elevated. Na 119. Leukocytosis noted with WBC 13.29. Troponin normal. Lactic acid elevated at 4.0.     CXR shows possible edema.     Discussed with critical care and they admitted to their service for further management of hyponatremia.     After admitted to ICU - CT head with no acute abnormality, CT abdomen/pelvis - bilateral perinephric stranding.        Attending Attestation:     Physician Attestation Statement for NP/PA:   I have conducted a face to face encounter with this patient in addition to the NP/PA, due to Medical Complexity    Other NP/PA Attestation Additions:      Medical Decision Makin yo male presenting with generalized weakness and feeling sweaty.  He states he has been constipated and drank a lot of water, but is unable to quantify the amount.  He then began vomiting and had an episode of shakiness, but did not lose consciousness.    On exam, he appears diaphoretic, not in acute distress.  RRR, lungs clear, nonfocal neuro exam.  No peripheral edema.    Na 119, likely due to excessive water drinking.  No indication for hypertonic saline at this time, as the description of the shaking episode does not sound like seizure-activity and he is not altered.    Na improved to 121 in the ED.    Agree with ICU admission to trend Na.             ED Course as of 22   Sat 2022   1416 POC Sodium(!!): 119 [AB]   1750 Potassium(!): 3.0  Ordered for re-pletion  [AB]   1750 Sodium(!): 121  Improved  [AB]   1750 Lactate, Mauro(!!): 4.0 [AB]   1750 Creatinine: 0.7  Normal  [AB]      ED Course User Index  [AB] Brian Chandler MD             Clinical Impression:   Final diagnoses:  [R61] Diaphoresis  [R41.82] Altered mental status  [E87.1] Hyponatremia (Primary)          ED  Disposition Condition    Admit               Yecenia Castro PA-C  06/25/22 1950       Brian Chandler MD  06/26/22 0783

## 2022-06-26 NOTE — PLAN OF CARE
[facility] DAILY GOALS       A: Awake    RASS: Goal -    Actual -     Restraint necessity:    B: Breathe   SBT: NA   C: Coordinate A & B, analgesics/sedatives   Pain: managed    SAT: NA  D: Delirium   CAM-ICU: Overall CAM-ICU: Negative  E: Early(intubated/ Progressive (non-intubated) Mobility   MOVE Screen: Pass   Activity: Activity Management: Up in chair - L3  FAS: Feeding/Nutrition   Diet order: Diet/Nutrition Received: regular,    T: Thrombus   DVT prophylaxis:    H: HOB Elevation   Head of Bed (HOB) Positioning: HOB elevated  U: Ulcer Prophylaxis   GI: no  G: Glucose control   N/A     S: Skin   Bathing/Skin Care: bath, complete, linen changed, electrode patches/site rotation     Pressure Reduction Devices: pressure-redistributing mattress utilized  Pressure Reduction Techniques: frequent weight shift encouraged  Skin Protection: adhesive use limited, incontinence pads utilized  B: Bowel Function   constipation   I: Indwelling Catheters   Escamilla necessity: [REMOVED]      Urethral Catheter 06/25/22 1630 Silicone 16 Fr.-Reason for Continuing Urinary Catheterization: Critically ill in ICU and requiring hourly monitoring of intake/output   CVC necessity: No  D: De-escalation Antibiotics   N/A    Family/Goals of care/Code Status   Code Status: Full Code    24H Vital Sign Range  Temp:  [97.5 °F (36.4 °C)-99.5 °F (37.5 °C)]   Pulse:  [60-90]   Resp:  [11-24]   BP: (106-169)/(59-90)   SpO2:  [95 %-98 %]      Shift Events   Serum Na normalized. Pt awake and alert, states he feels well enough to go home. Unable to downgrade to lower level of care, waiting for a bed. Sitting in a chair watching movie on personal notebook. Medicated for chronic back pain.    VS and assessment per flow sheet, patient progressing towards goals as tolerated, plan of care reviewed with  Patient and Meghana , all concerns addressed, will continue to monitor.    Elizabeth Gonzalez

## 2022-06-26 NOTE — ASSESSMENT & PLAN NOTE
Miralax ordered, and pt educated on the need to increase dietary fiber intake (fatou with diverticulae noted on CT abdo).

## 2022-06-26 NOTE — HOSPITAL COURSE
He was given 100 mL of 3% saline over 2 hours on arrival to the Unit.  His Na reached 136 afterwards, so to avoid correcting faster than goal he was given 1 mg of DDAVP and started on D5.  His Na dropped to 134 as a result.  The following morning, he was neurologically at baseline so he was stepped down to  for further monitoring and treatment.  He also stated he has been frequently constipated of late, and his abdomen was obviously full and distended.  Laxatives were started.

## 2022-06-26 NOTE — ASSESSMENT & PLAN NOTE
Leukocytosis  Lactic acidosis    Patient with sodium of 119 on ISTAT notable for worsening mental status, increased nausea, vomiting, and seizure-like activity following consumption of up to 1 gallon of water. Suspected recent viral illness. Elevated lactate and WBC likely related to seizure. Patient received 500 ml IVF in ER and then 50 ml/hr hypertonic (3%) saline for 2 hours on admission to MICU.  Na corrected to 136, so pt then given DDAVP and D5.  Mental status back to baseline on 6/26.    PLAN:  · No more corrective measures today (6/26)  · Step down to Hospital Medicine  · Goal for 6/26: 134.  Goal for 6/27: WNL  · BMP q2  · Seizure precautions  · Strict intake and outputs: Escamilla catheter placed given his confusion and acuity of condition, but OK to remove now as long as output is accurately charted  · Better constipation control as per below

## 2022-06-26 NOTE — PROGRESS NOTES
[facility]  Critical Care Medicine  Progress Note    Patient Name: Nicanor Solis  MRN: [id number]  Admission Date: 6/25/2022  Hospital Length of Stay: 1 days  Code Status: Full Code  Attending Provider: Yasemin Garzon MD  Primary Care Provider: Ford Barrera MD   Principal Problem: Hyponatremia    Subjective:     HPI:  Nicanor Solis is a previously-healthy 36 y.o. male who presented to [facility] ED on 6/25/22 with AMS, seizure-like activity and diaphoresis.  History is provided by his wife at bedside, who says they have been having a viral respiratory illness (RSV suspected) going around at home.  She states that approximately 1 hour prior to presentation he became feeling weak, dizzy, and as though he may faint. She says that he felt constipated this morning, so he drank 1 gallon of water to assist w/ loosening bowel movements. She says that he became acutely extremely diaphoretic and started speaking nonsense as well as asking for help.  She also says she witnessed a seizure-like activity in passenger seat of car on way here: he had a brief full-body spasm but she says he did not pass out.    Denies any recent illness, chest pain, SOB prior to presentation.    Pt was noted to be lethargic, clammy, pale diaphoretic, lethargic on ED arrival.  Afebrile, hypertensive, RR 15 and satting 96 on RA.  ISTAT CHEM8: Na 119.  WBC 13.19.  He was given a  mL bolus.  On our interview, he remained hypertensive but was tachypneic, tremulous, confused.  He was admitted to the MICU for further evaluation and management.      Hospital/ICU Course:  He was given 100 mL of 3% saline over 2 hours on arrival to the Unit.  His Na reached 136 afterwards, so to avoid correcting faster than goal he was given 1 mg of DDAVP and started on D5.  His Na dropped to 134 as a result.  The following morning, he was neurologically at baseline so he was stepped down to  for further monitoring and treatment.  He also stated he  has been frequently constipated of late, and his abdomen was obviously full and distended.  Laxatives were started.      Interval History/Significant Events: NAEON. See hospital course.      Review of Systems   Constitutional:  Negative for chills and fever.   HENT:  Positive for postnasal drip and sneezing. Negative for sore throat.    Eyes:  Negative for pain.   Respiratory:  Negative for cough and shortness of breath.    Cardiovascular:  Negative for chest pain.   Gastrointestinal:  Positive for abdominal distention and constipation. Negative for abdominal pain, diarrhea, nausea and vomiting.   Genitourinary:  Negative for dysuria.   Musculoskeletal:  Positive for back pain.   Skin:  Negative for rash.   Neurological:  Negative for dizziness, weakness and headaches.   Objective:     Vital Signs (Most Recent):  Temp: 99.2 °F (37.3 °C) (06/26/22 0715)  Pulse: 76 (06/26/22 0900)  Resp: 19 (06/26/22 0900)  BP: 124/73 (06/26/22 0900)  SpO2: 95 % (06/26/22 0900) Vital Signs (24h Range):  Temp:  [97.5 °F (36.4 °C)-99.2 °F (37.3 °C)] 99.2 °F (37.3 °C)  Pulse:  [60-95] 76  Resp:  [11-27] 19  SpO2:  [95 %-100 %] 95 %  BP: (108-185)/(64-90) 124/73   Weight: 85.7 kg (189 lb)  Body mass index is 29.6 kg/m².      Intake/Output Summary (Last 24 hours) at 6/26/2022 1000  Last data filed at 6/26/2022 0804  Gross per 24 hour   Intake 1245.79 ml   Output 7400 ml   Net -6154.21 ml       Physical Exam  Constitutional:       Appearance: Normal appearance. He is normal weight. He is not ill-appearing.   HENT:      Head: Normocephalic and atraumatic.      Right Ear: External ear normal.      Left Ear: External ear normal.      Nose: Nose normal.      Mouth/Throat:      Mouth: Mucous membranes are moist.      Pharynx: Oropharynx is clear.   Eyes:      Extraocular Movements: Extraocular movements intact.      Conjunctiva/sclera: Conjunctivae normal.   Neck:      Vascular: No carotid bruit.   Cardiovascular:      Rate and Rhythm: Normal  rate and regular rhythm.      Pulses: Normal pulses.      Heart sounds: Normal heart sounds.   Pulmonary:      Effort: Pulmonary effort is normal.      Breath sounds: Normal breath sounds.   Abdominal:      General: Bowel sounds are normal. There is distension.      Tenderness: There is no abdominal tenderness. There is no guarding or rebound.   Musculoskeletal:         General: Normal range of motion.      Cervical back: Normal range of motion and neck supple.      Right lower leg: No edema.      Left lower leg: No edema.   Skin:     General: Skin is warm and dry.      Capillary Refill: Capillary refill takes less than 2 seconds.   Neurological:      General: No focal deficit present.      Mental Status: He is alert and oriented to person, place, and time. Mental status is at baseline.   Psychiatric:         Mood and Affect: Mood normal.         Behavior: Behavior normal.         Thought Content: Thought content normal.         Judgment: Judgment normal.       Vents:     Lines/Drains/Airways       Peripheral Intravenous Line  Duration                  Peripheral IV - Single Lumen 06/25/22 1406 20 G Left Hand <1 day         Peripheral IV - Single Lumen 06/25/22 1750 18 G Right Antecubital <1 day         Peripheral IV - Single Lumen 06/25/22 1751 20 G Left Antecubital <1 day                  Significant Labs:    CBC/Anemia Profile:  Recent Labs   Lab 06/25/22  1404 06/25/22  1409 06/26/22  0448   WBC  --  13.29* 6.12   HGB  --  12.8* 13.7*   HCT 40 36.6* 38.9*   PLT  --  212 194   MCV  --  86 84   RDW  --  12.2 12.0        Chemistries:  Recent Labs   Lab 06/25/22  1652 06/25/22  2052 06/26/22  0448 06/26/22  0632 06/26/22  0811   *   < > 135* 136 134*   K 3.0*   < > 4.3 4.5 4.4   CL 87*   < > 102 103 102   CO2 21*   < > 24 23 22*   BUN 9   < > 5* 6 6   CREATININE 0.7   < > 0.7 0.7 0.8   CALCIUM 8.1*   < > 8.8 8.8 9.1   ALBUMIN 4.0  --   --   --   --    PROT 6.7  --   --   --   --    BILITOT 1.0  --   --   --    --    ALKPHOS 53*  --   --   --   --    ALT 54*  --   --   --   --    AST 91*  --   --   --   --    MG 1.3*  --  2.0  --   --    PHOS 2.7  --  3.6  --   --     < > = values in this interval not displayed.       Lactic Acid:   Recent Labs   Lab 06/25/22  1409   LACTATE 4.0*         Significant Imaging:  I have reviewed all pertinent imaging results/findings within the past 24 hours.  CT: I have reviewed all pertinent results/findings within the past 24 hours and my personal findings are:  CTH WNL.  CT abdo with perinephric fat stranding and several diverticuli.      ABG  No results for input(s): PH, PO2, PCO2, HCO3, BE in the last 168 hours.  Assessment/Plan:     Renal/  * Hyponatremia  Leukocytosis  Lactic acidosis    Patient with sodium of 119 on ISTAT notable for worsening mental status, increased nausea, vomiting, and seizure-like activity following consumption of up to 1 gallon of water. Suspected recent viral illness. Elevated lactate and WBC likely related to seizure. Patient received 500 ml IVF in ER and then 50 ml/hr hypertonic (3%) saline for 2 hours on admission to MICU.  Na corrected to 136, so pt then given DDAVP and D5.  Mental status back to baseline on 6/26.    PLAN:  · No more corrective measures today (6/26)  · Step down to Hospital Medicine  · Goal for 6/26: 134.  Goal for 6/27: WNL  · BMP q2  · Seizure precautions  · Strict intake and outputs: Escamilla catheter placed given his confusion and acuity of condition, but OK to remove now as long as output is accurately charted  · Better constipation control as per below    GI  Constipation  Miralax ordered, and pt educated on the need to increase dietary fiber intake (fatou with diverticulae noted on CT abdo).      Critical was secondary to: patient has a condition that poses threat to life and bodily function: Symptomatic acute hyponatremia.  Pt no longer symptomatic.    Pt no longer has ICU needs.  Step down order placed.      Critical care was time  spent personally by me on the following activities: development of treatment plan with patient or surrogate and bedside caregivers, discussions with consultants, evaluation of patient's response to treatment, examination of patient, ordering and performing treatments and interventions, ordering and review of laboratory studies, ordering and review of radiographic studies, pulse oximetry, re-evaluation of patient's condition. This critical care time did not overlap with that of any other provider or involve time for any procedures.     Erich Jackson MD  Critical Care Medicine  [facility] - Cardiac Medical [facility]

## 2022-06-27 VITALS
BODY MASS INDEX: 30.59 KG/M2 | WEIGHT: 194.88 LBS | HEIGHT: 67 IN | HEART RATE: 55 BPM | RESPIRATION RATE: 18 BRPM | OXYGEN SATURATION: 95 % | DIASTOLIC BLOOD PRESSURE: 77 MMHG | TEMPERATURE: 98 F | SYSTOLIC BLOOD PRESSURE: 125 MMHG

## 2022-06-27 PROBLEM — M62.82 RHABDOMYOLYSIS: Status: ACTIVE | Noted: 2022-06-27

## 2022-06-27 PROBLEM — R00.1 BRADYCARDIA: Status: ACTIVE | Noted: 2022-06-27

## 2022-06-27 LAB
ANION GAP SERPL CALC-SCNC: 7 MMOL/L (ref 8–16)
BASOPHILS # BLD AUTO: 0.05 K/UL (ref 0–0.2)
BASOPHILS NFR BLD: 1.2 % (ref 0–1.9)
BUN SERPL-MCNC: 9 MG/DL (ref 6–20)
CALCIUM SERPL-MCNC: 8.9 MG/DL (ref 8.7–10.5)
CHLORIDE SERPL-SCNC: 105 MMOL/L (ref 95–110)
CK SERPL-CCNC: 1079 U/L (ref 20–200)
CO2 SERPL-SCNC: 25 MMOL/L (ref 23–29)
CREAT SERPL-MCNC: 0.7 MG/DL (ref 0.5–1.4)
DIFFERENTIAL METHOD: ABNORMAL
EOSINOPHIL # BLD AUTO: 0.2 K/UL (ref 0–0.5)
EOSINOPHIL NFR BLD: 3.9 % (ref 0–8)
ERYTHROCYTE [DISTWIDTH] IN BLOOD BY AUTOMATED COUNT: 12.8 % (ref 11.5–14.5)
EST. GFR  (AFRICAN AMERICAN): >60 ML/MIN/1.73 M^2
EST. GFR  (NON AFRICAN AMERICAN): >60 ML/MIN/1.73 M^2
GLUCOSE SERPL-MCNC: 101 MG/DL (ref 70–110)
HCT VFR BLD AUTO: 39.4 % (ref 40–54)
HGB BLD-MCNC: 13.7 G/DL (ref 14–18)
IMM GRANULOCYTES # BLD AUTO: 0.02 K/UL (ref 0–0.04)
IMM GRANULOCYTES NFR BLD AUTO: 0.5 % (ref 0–0.5)
LYMPHOCYTES # BLD AUTO: 1.6 K/UL (ref 1–4.8)
LYMPHOCYTES NFR BLD: 36.4 % (ref 18–48)
MAGNESIUM SERPL-MCNC: 2 MG/DL (ref 1.6–2.6)
MCH RBC QN AUTO: 30 PG (ref 27–31)
MCHC RBC AUTO-ENTMCNC: 34.8 G/DL (ref 32–36)
MCV RBC AUTO: 86 FL (ref 82–98)
MONOCYTES # BLD AUTO: 0.8 K/UL (ref 0.3–1)
MONOCYTES NFR BLD: 19 % (ref 4–15)
NEUTROPHILS # BLD AUTO: 1.7 K/UL (ref 1.8–7.7)
NEUTROPHILS NFR BLD: 39 % (ref 38–73)
NRBC BLD-RTO: 0 /100 WBC
PHOSPHATE SERPL-MCNC: 3.3 MG/DL (ref 2.7–4.5)
PLATELET # BLD AUTO: 161 K/UL (ref 150–450)
PMV BLD AUTO: 10.1 FL (ref 9.2–12.9)
POTASSIUM SERPL-SCNC: 4.3 MMOL/L (ref 3.5–5.1)
RBC # BLD AUTO: 4.57 M/UL (ref 4.6–6.2)
SODIUM SERPL-SCNC: 137 MMOL/L (ref 136–145)
WBC # BLD AUTO: 4.31 K/UL (ref 3.9–12.7)

## 2022-06-27 PROCEDURE — 36415 COLL VENOUS BLD VENIPUNCTURE: CPT | Performed by: STUDENT IN AN ORGANIZED HEALTH CARE EDUCATION/TRAINING PROGRAM

## 2022-06-27 PROCEDURE — 25000003 PHARM REV CODE 250: Performed by: INTERNAL MEDICINE

## 2022-06-27 PROCEDURE — 83735 ASSAY OF MAGNESIUM: CPT | Performed by: INTERNAL MEDICINE

## 2022-06-27 PROCEDURE — 25000003 PHARM REV CODE 250

## 2022-06-27 PROCEDURE — 93010 EKG 12-LEAD: ICD-10-PCS | Mod: ,,, | Performed by: INTERNAL MEDICINE

## 2022-06-27 PROCEDURE — 82550 ASSAY OF CK (CPK): CPT | Performed by: INTERNAL MEDICINE

## 2022-06-27 PROCEDURE — 93005 ELECTROCARDIOGRAM TRACING: CPT

## 2022-06-27 PROCEDURE — 99239 PR HOSPITAL DISCHARGE DAY,>30 MIN: ICD-10-PCS | Mod: ,,, | Performed by: HOSPITALIST

## 2022-06-27 PROCEDURE — 84100 ASSAY OF PHOSPHORUS: CPT | Performed by: INTERNAL MEDICINE

## 2022-06-27 PROCEDURE — 85025 COMPLETE CBC W/AUTO DIFF WBC: CPT | Performed by: STUDENT IN AN ORGANIZED HEALTH CARE EDUCATION/TRAINING PROGRAM

## 2022-06-27 PROCEDURE — 93010 ELECTROCARDIOGRAM REPORT: CPT | Mod: ,,, | Performed by: INTERNAL MEDICINE

## 2022-06-27 PROCEDURE — 99239 HOSP IP/OBS DSCHRG MGMT >30: CPT | Mod: ,,, | Performed by: HOSPITALIST

## 2022-06-27 PROCEDURE — 80048 BASIC METABOLIC PNL TOTAL CA: CPT | Performed by: HOSPITALIST

## 2022-06-27 PROCEDURE — 25000003 PHARM REV CODE 250: Performed by: NURSE PRACTITIONER

## 2022-06-27 RX ADMIN — DEXTROSE MONOHYDRATE: 5 INJECTION, SOLUTION INTRAVENOUS at 05:06

## 2022-06-27 RX ADMIN — LIDOCAINE 1 PATCH: 50 PATCH CUTANEOUS at 10:06

## 2022-06-27 RX ADMIN — MUPIROCIN: 20 OINTMENT TOPICAL at 10:06

## 2022-06-27 RX ADMIN — POLYETHYLENE GLYCOL 3350 17 G: 17 POWDER, FOR SOLUTION ORAL at 10:06

## 2022-06-27 NOTE — PLAN OF CARE
[facility] - Cardiology Stepdown  Initial Discharge Assessment       Primary Care Provider: Ford Barrera MD    Admission Diagnosis: Diaphoresis [R61]  Hyponatremia [E87.1]  Altered mental status [R41.82]    Admission Date: 6/25/2022  Expected Discharge Date: 6/27/2022    Discharge Barriers Identified: None    Payor: [employer]ETNA / Plan: [employer] OPEN ACCESS MANAGED CHOICE / Product Type: Commercial /     Extended Emergency Contact Information  Primary Emergency Contact: Yojana Vick   [place]  Mobile Phone: [phone]  Relation: Significant other  Preferred language: English   needed? No  Secondary Emergency Contact: Dominique Jo   [place]  Mobile Phone: [phone]  Relation: Sister    Discharge Plan A: Home, Home with family  Discharge Plan B: Home with family      [id number] [place] [place] [facility] [address]  [address]  [place] [address]  Phone: [phone] Fax: [phone]      Initial Assessment (most recent)     Adult Discharge Assessment - 06/27/22 1100        Discharge Assessment    Assessment Type Discharge Planning Assessment     Confirmed/corrected address, phone number and insurance Yes     Confirmed Demographics Correct on Facesheet     Source of Information patient     Communicated PARAS with patient/caregiver Yes     Lives With spouse     Do you expect to return to your current living situation? Yes     Do you have help at home or someone to help you manage your care at home? Yes     Who are your caregiver(s) and their phone number(s)? Spouse Yojana Vick     Prior to hospitilization cognitive status: Alert/Oriented     Current cognitive status: Alert/Oriented     Walking or Climbing Stairs Difficulty none     Equipment Currently Used at Home none     Readmission within 30 days? No     Patient currently being followed by outpatient case management? No     Do you currently have service(s) that help you manage your care at home? No      Do you take prescription medications? Yes     Do you have prescription coverage? Yes     Coverage [employer]     Do you have any problems affording any of your prescribed medications? No     Is the patient taking medications as prescribed? yes     How do you get to doctors appointments? car, drives self;public transportation     Are you on dialysis? No     Do you take coumadin? No     Discharge Plan A Home;Home with family     Discharge Plan B Home with family     DME Needed Upon Discharge  none     Discharge Plan discussed with: Spouse/sig other;Patient     Discharge Barriers Identified None                    Pt admitted 6/25/2022  1:43 PM    For Diaphoresis [R61]  Hyponatremia [E87.1]  Altered mental status [R41.82]       DPEA completed with pt and spouse by bedside, plan is home with no needs.    Discharge packet provided to pt, all questions answered prior to leaving room and contact number provided to reach CM.      PCP is Ford Barrera MD  [phone] (p)  [phone] (f)    Future Appointments   Date Time Provider Department Center   7/1/2022  9:30 AM Elida Morfin PA-C [facility]   7/15/2022 11:45 AM Ford Barrera MD [facility]   2/3/2023  7:05 AM LAB, [facility]         BRYN White, RN   Case Management [phone]

## 2022-06-27 NOTE — PLAN OF CARE
Problem: Adult Inpatient Plan of Care  Goal: Plan of Care Review  Outcome: Ongoing, Progressing  Goal: Patient-Specific Goal (Individualized)  Outcome: Ongoing, Progressing  Goal: Absence of Hospital-Acquired Illness or Injury  Outcome: Ongoing, Progressing  Goal: Optimal Comfort and Wellbeing  Outcome: Ongoing, Progressing  Goal: Readiness for Transition of Care  Outcome: Ongoing, Progressing     Problem: Infection  Goal: Absence of Infection Signs and Symptoms  Outcome: Ongoing, Progressing     Problem: Electrolyte Imbalance  Goal: Electrolyte Balance  Outcome: Ongoing, Progressing     Problem: Thought Process Alteration  Goal: Optimal Thought Clarity  Outcome: Ongoing, Progressing     Problem: Fall Injury Risk  Goal: Absence of Fall and Fall-Related Injury  Outcome: Ongoing, Progressing     Problem: Pain Acute  Goal: Acceptable Pain Control and Functional Ability  Outcome: Ongoing, Progressing     Problem: Skin Injury Risk Increased  Goal: Skin Health and Integrity  Outcome: Ongoing, Progressing   Cardiology Step Down. See progress note and flowsheet. Plan: Continue to monitor patient and report any abnormalities in labs, vitals signs, and body system functions to physician as indicated. Patient was given education on their disease process and medications.

## 2022-06-27 NOTE — PLAN OF CARE
[facility]  Discharge Final Note    Primary Care Provider: Ford Barrera MD    Expected Discharge Date: 6/27/2022    Final Discharge Note (most recent)     Final Note - 06/27/22 1402        Final Note    Assessment Type Final Discharge Note     Anticipated Discharge Disposition Home or Self Care     Hospital Resources/Appts/Education Provided Provided patient/caregiver with written discharge plan information;Appointments scheduled by Navigator/Coordinator;Post-Acute resouces added to AVS                 Important Message from Medicare             Contact Info     Ford Barrera MD   Specialty: Internal Medicine   Relationship: PCP - General    [facility] [address]  [facility] [address]   Phone: [phone]       Next Steps: Follow up    Instructions: 2-3 weeks for hospital follow up      BRYN White, RN    [phone]

## 2022-06-27 NOTE — DISCHARGE SUMMARY
DISCHARGE SUMMARY  Hospital Medicine    Team: [facility] [place] [facility]    Patient Name: Nicanor Solis  YOB: 1985    Admit Date: 6/25/2022    Discharge Date: 06/27/2022    Discharge Attending Physician: Alaina Garcia MD    Principal Diagnoses:  Active Hospital Problems    Diagnosis  POA    *Hyponatremia [E87.1]  Yes    Rhabdomyolysis [M62.82]  Yes    Bradycardia [R00.1]  Yes    Constipation [K59.00]  Yes    Leukocytosis [D72.829]  Yes    Lactic acidosis [E87.2]  Yes      Resolved Hospital Problems   No resolved problems to display.       Discharged Condition: improved     Interval history- he reports feeling better today with wife at bedside. He reports that he had some viral URI lately and had been taking it easy, works out frequently and does some body pump and workouts recently had been having more leg soreness than normal but no dark urine or dec in urination. Worked out probably Tuesday and then was fairly sore after. He does swimming at indoor pool as well. He hydrates with water throughout the day fairly aggressively but before he came in he had the large amount of water in 1 setting and then had aggressive sweating and vomiting and suspect that even though he has a large amount of water intake and less solute intake (does use some of the packs to put into the wtaer with vvitamins in it and salts) that the timing was what caused the major issue with the hyponatremi here give how fast the free water without the solute was given which caused the seizure like actiivty and altered mental status libiaes his wife described to me on way to ER and in the Er initially requiring 3% NS and the some quick overcorrection with brief ddavp given and has had normal Na after this now. He did have CK in 2000s-1000s now so is unclear if this was from sz type episode of the tail end of a higher rhabdo from previous in the week. I suspect probably had rhabdo earlier than this as he was working out with [employer]  after having a viral illness so may have been more recently deconditioned he described from that illness and then had been going to the sauna after workouts in the AM with likely solute losses from the skin with sweating as loss for water but also salt and if replacing losses with high amounts of free water on top of insensible water losses from illness then have multiple reasons to have had recent subacute to then acute issues with hyponatremia that he had. Would continue oral intake of at least 2L water a day but he drinks good amount of water and encouraged drinking to thirst so he doesn't get to where hes so thirsty he drinks an extremely large amount at one time that led to this but will need to avoid heavy exercises for another week or so with CK sttill mildly up but likely will normalize in another day or two I suspect on current trend. BM overnight ith miralax and this + lidoacine patch sent to pharmacy for dischage for him. hes very active and young and fit so likely cause for bsaeline bradycardia he has had prior to admit. Had some 40s overinght but on tele do not seen concerning features, some PACS but unless has major symptoms would not be concerned for heart block given age and rapid improvement with awakening and activity, if has diziness or reoccurene would consider holter monitor via PCP in the future. Stable for dc and rec PCP f/u in 7-10 days for follow up.    Temp:  [97.2 °F (36.2 °C)-99.5 °F (37.5 °C)]   Pulse:  [48-84]   Resp:  [18-21]   BP: (106-142)/(59-82)   SpO2:  [94 %-99 %]      Physical Exam  Constitutional:       Appearance: Normal appearance. He is normal weight. He is not ill-appearing. Pleasant. Wife at bedside.  HENT:      Head: Normocephalic and atraumatic.      Right Ear: External ear normal.      Left Ear: External ear normal.      Nose: Nose normal.      Mouth/Throat:      Mouth: Mucous membranes are moist.      Pharynx: Oropharynx is clear.   Eyes:      Extraocular Movements:  Extraocular movements intact.      Conjunctiva/sclera: Conjunctivae normal.   Neck:      Vascular: No carotid bruit.   Cardiovascular:      Rate and Rhythm: Normal rate and regular rhythm.      Pulses: Normal pulses.      Heart sounds: Normal heart sounds.   Pulmonary:      Effort: Pulmonary effort is normal.      Breath sounds: Normal breath sounds.   Abdominal:      General: Bowel sounds are normal.      Tenderness: There is no abdominal tenderness. There is no guarding or rebound.   Musculoskeletal:         General: Normal range of motion.      Cervical back: Normal range of motion and neck supple.      Right lower leg: No edema.      Left lower leg: No edema.   Skin:     General: Skin is warm and dry.      Capillary Refill: Capillary refill takes less than 2 seconds.   Neurological:      General: No focal deficit present.      Mental Status: He is alert and oriented to person, place, and time. Mental status is at baseline.   Psychiatric:         Mood and Affect: Mood normal.         Behavior: Behavior normal.         Thought Content: Thought content normal.         Judgment: Judgment normal.     HOSPITAL COURSE:      Initial Presentation:    Nicanor Solis is a previously-healthy 36 y.o. male who presented to [facility] ED on 6/25/22 with AMS, seizure-like activity and diaphoresis.  History is provided by his wife at bedside, who says they have been having a viral respiratory illness (RSV suspected) going around at home.  She states that approximately 1 hour prior to presentation he became feeling weak, dizzy, and as though he may faint. She says that he felt constipated this morning, so he drank 1 gallon of water to assist w/ loosening bowel movements. She says that he became acutely extremely diaphoretic and started speaking nonsense as well as asking for help.  She also says she witnessed a seizure-like activity in passenger seat of car on way here: he had a brief full-body spasm but she says he did not pass out.     Denies any recent illness, chest pain, SOB prior to presentation.     Pt was noted to be lethargic, clammy, pale diaphoretic, lethargic on ED arrival.  Afebrile, hypertensive, RR 15 and satting 96 on RA.  ISTAT CHEM8: Na 119.  WBC 13.19.  He was given a  mL bolus.  On our interview, he remained hypertensive but was tachypneic, tremulous, confused.  He was admitted to the MICU for further evaluation and management.    Course of Principle Problem for Admission:    Hyponatremia  Leukocytosis  Lactic acidosis     Patient with sodium of 119 on ISTAT notable for worsening mental status, increased nausea, vomiting, and seizure-like activity following consumption of up to 1 gallon of water. Suspected recent viral illness. Elevated lactate and WBC likely related to seizure. Patient received 500 ml IVF in ER and then 50 ml/hr hypertonic (3%) saline for 2 hours on admission to MICU.  Na corrected to 136, so pt then given DDAVP and D5.  Mental status back to baseline on 6/26 with improvement in Na and stability since then  -discussed above about Na and water monitoring and role of this in causing seizure and management - see interval history       Rhabdomyolysis  -unclear if present before the hyponatremia issue given recent workouts more intense than prev when ill but potentially may be tail end and not necessarily from seizure but improving from 2k to 1k and continue oral intake at home isotonic to continue to flush CK to normalize and avoid body pump for a week    Bradycardia  -likely normal variant given physically active and young, no signs of heart block or concerning findings on ekg  -if becomes syymptomatic then would consider holter with pcp  -if has any return of hyponatremia in conjunction with bradycardia would consider assessment of thyroid function with pcp in addition    Constipation  Miralax ordered, and pt educated on the need to increase dietary fiber intake (fatou with diverticulae noted on CT  prieto).      Consults: ICU    Last CBC/BMP:    CBC/Anemia Labs: Coags:    Recent Labs   Lab 06/25/22  1409 06/26/22  0448 06/27/22  0405   WBC 13.29* 6.12 4.31   HGB 12.8* 13.7* 13.7*   HCT 36.6* 38.9* 39.4*    194 161   MCV 86 84 86   RDW 12.2 12.0 12.8    No results for input(s): PT, INR, APTT in the last 168 hours.     Chemistries:   Recent Labs   Lab 06/25/22  1652 06/25/22  2052 06/26/22  0448 06/26/22  0632 06/26/22  1258 06/26/22  1807 06/27/22  0405   *   < > 135*   < > 136 136 137   K 3.0*   < > 4.3   < > 4.2 4.0 4.3   CL 87*   < > 102   < > 101 103 105   CO2 21*   < > 24   < > 24 20* 25   BUN 9   < > 5*   < > 8 8 9   CREATININE 0.7   < > 0.7   < > 0.8 0.9 0.7   CALCIUM 8.1*   < > 8.8   < > 8.9 8.8 8.9   PROT 6.7  --   --   --   --   --   --    BILITOT 1.0  --   --   --   --   --   --    ALKPHOS 53*  --   --   --   --   --   --    ALT 54*  --   --   --   --   --   --    AST 91*  --   --   --   --   --   --    MG 1.3*  --  2.0  --   --   --  2.0   PHOS 2.7  --  3.6  --   --   --  3.3    < > = values in this interval not displayed.              Special Treatments/Procedures:   * No surgery found *     Disposition: Home or Self Care      Future Scheduled Appointments:  Future Appointments   Date Time Provider Department Center   7/1/2022  9:30 AM Elida Morfin PA-C [facility]   7/15/2022 11:45 AM Ford Barrera MD [facility]   2/3/2023  7:05 AM LAB, [facility]           Discharge Medication List:     Medication List      START taking these medications    GAVILAX 17 gram/dose powder  Generic drug: polyethylene glycol  Dissolve one capful (17 g) in liquid and take by mouth daily as needed (if no BM for 2 days).     LIDOcaine 5 %  Commonly known as: LIDODERM  Place 1 patch onto the skin daily as needed (apply to lower back prn pain). Remove & Discard patch within 12 hours or as directed by MD        CONTINUE taking these medications    cetirizine 10 MG  tablet  Commonly known as: ZYRTEC  Take 1 tablet (10 mg total) by mouth once daily.        STOP taking these medications    hydrocortisone 2.5 % cream           Where to Get Your Medications      These medications were sent to [facility]  [address] [facility] [address]    Hours: Mon-Fri 7a-7p, Sat-Sun 10a-4p Phone: [phone]   · GAVILAX 17 gram/dose powder  · LIDOcaine 5 %         Patient Instructions:  Discharge Procedure Orders   Diet Adult Regular     Notify your health care provider if you experience any of the following:  persistent nausea and vomiting or diarrhea     Notify your health care provider if you experience any of the following:  severe uncontrolled pain     Notify your health care provider if you experience any of the following:  persistent dizziness, light-headedness, or visual disturbances     Notify your health care provider if you experience any of the following:  increased confusion or weakness     Activity as tolerated       At the time of discharge patient was told to take all medications as prescribed, to keep all followup appointments, and to call their primary care physician or return to the emergency room if they have any worsening or concerning symptoms.    Signing Physician:  Alaina Garcia MD

## 2022-06-27 NOTE — NURSING
Physician notified of pt HR <35 while resting. HR between 32-48. STAT EKG ordered. While awake pt is currently 52 with asymptomatic bradycardia. Will continue to monitor.

## 2022-06-27 NOTE — DISCHARGE INSTRUCTIONS
When do I need to call the doctor?   Cramping or twitching of your muscles  Upset stomach, throwing up, or loose stools  Feeling weak or tired  Abnormal heartbeat  Very bad headache  You or your family notice that you are not thinking clearly  You do not know where you are  Seizure  Loss of consciousness

## 2022-06-27 NOTE — MEDICAL/APP STUDENT
"Progress Note   Hospital Medicine         Patient Name: Nicanor Solis  MRN:  [id number]  [facility] Medicine Team: [facility] [place] [facility]   Date of Admission:  6/25/2022     Length of Stay:  LOS: 2 days   Attending Physician: Alaina Garcia MD  Primary Care Provider: Ford Barrera MD    Subjective:     Principal Problem: Hyponatremia    HPI: Nicanor Solis is a previously-healthy 36 y.o. male who presented to [facility] ED on 6/25/22 with AMS, seizure-like activity and diaphoresis.  History is provided by his wife at bedside, who says they have been having a viral respiratory illness (RSV suspected) going around at home.  She states that approximately 1 hour prior to presentation he became feeling weak, dizzy, and as though he may faint. She says that he felt constipated this morning, so he drank 1 gallon of water to assist w/ loosening bowel movements. She says that he became acutely extremely diaphoretic and started speaking nonsense as well as asking for help.  She also says she witnessed a seizure-like activity in passenger seat of car on way here: he had a brief full-body spasm but she says he did not pass out.    Denies any recent illness, chest pain, SOB prior to presentation.     Pt was noted to be lethargic, clammy, pale diaphoretic, lethargic on ED arrival.  Afebrile, hypertensive, RR 15 and satting 96 on RA.  ISTAT CHEM8: Na 119.  WBC 13.19.  He was given a  mL bolus.  On our interview, he remained hypertensive but was tachypneic, tremulous, confused.  He was admitted to the MICU for further evaluation and management.    Interval History/Overnight Events:    Today, pt reports feeling "much better". Pt is neurologically at baseline. Denies headaches or visual disturbances. Reports having "mild lower back pain", but states that it is chronic as he "works out regularly" - discussed with pt that pain could be also symptom of rhabdomyolysis. Pt noted that he changed workout regimens earlier this week and was " "feeling "significantly more sore than usual" - discussed with pt that this could have resulted in mild rhabdo and possibly precipitated his presentation in the ED. Discussed with pt that he should do light workouts for the next week to help with recovery process. Also discussed consuming water over long periods of time and to thirst to prevent recurrence. Pt also reports moving bowels today, but still reports feeling constipated and bloated. Pt had a bradycardic event overnight, but remained asymptomatic. EKG showed occasional arrhythmias - discussed with pt that this could be normal. Will send patient home with Holter monitor. Pt is ready to be discharged home.    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 Authorizing Provider   cetirizine (ZYRTEC) 10 MG tablet Take 1 tablet (10 mg total) by mouth once daily. 1/11/22 1/11/23  Leon Baum MD   LIDOcaine (LIDODERM) 5 % Place 1 patch onto the skin daily as needed (apply to lower back prn pain). Remove & Discard patch within 12 hours or as directed by MD 6/26/22   Alaina Garcia MD   polyethylene glycol (GLYCOLAX) 17 gram/dose powder Dissolve one capful (17 g) in liquid and take by mouth daily as needed (if no BM for 2 days). 6/26/22   Alaina Garcia MD   hydrocortisone 2.5 % cream Apply topically 2 (two) times daily. 1/11/22 6/26/22  Leon Baum MD       Family History:  Family History   Problem Relation Age of Onset    Hypertension Father        Social " History:  Social History     Tobacco Use    Smoking status: Never Smoker    Smokeless tobacco: Never Used   Substance Use Topics    Alcohol use: Yes     Alcohol/week: 5.0 standard drinks     Types: 5 Cans of beer per week    Drug use: No       Review of Systems   Constitutional: Negative for chills, fatigue, fever.   HENT: Negative for sore throat, trouble swallowing.    Eyes: Negative for photophobia, visual disturbance.   Respiratory: Negative for cough, shortness of breath.    Cardiovascular: Negative for chest pain, palpitations, leg swelling.   Gastrointestinal: Negative for abdominal pain, diarrhea, nausea, vomiting. Feels constipated and bloated.   Endocrine: Negative for cold intolerance, heat intolerance.   Genitourinary: Negative for dysuria, frequency.   Musculoskeletal: Positive for lower back pain.   Skin: Negative for rash, wound, erythema   Neurological: Negative for dizziness, syncope, weakness, light-headedness.   Psychiatric/Behavioral: Negative for confusion, hallucinations, anxiety  All other systems reviewed and are negative.      Objective:       Vital Signs Recent:  Temp: 97.8 °F (36.6 °C) (06/27/22 1200)  Pulse: (!) 55 (06/27/22 1200)  Resp: 18 (06/27/22 1200)  BP: 125/77 (06/27/22 1200)  SpO2: 95 % (06/27/22 1200)  Oxygen Documentation:                O2 Device (Oxygen Therapy): room air         Vital Signs Range (Last 24H):  Temp:  [97.2 °F (36.2 °C)-99.5 °F (37.5 °C)]   Pulse:  [48-84]   Resp:  [18-21]   BP: (106-142)/(59-82)   SpO2:  [94 %-99 %]        I & O (Last 24H):    Intake/Output Summary (Last 24 hours) at 6/27/2022 1447  Last data filed at 6/27/2022 1100  Gross per 24 hour   Intake 2300 ml   Output 4100 ml   Net -1800 ml        Weight: 88.4kg (194lb 14.2oz)  Body mass index is 30.52 kg/m².    Physical Exam:  Constitutional: Appears well-developed and well-nourished.   Head: Normocephalic and atraumatic.   Mouth/Throat: Oropharynx is clear and moist.   Eyes: EOM are normal.  Pupils are equal, round, and reactive to light. No scleral icterus.   Neck: Normal range of motion. Neck supple.   Cardiovascular: Normal rate and regular rhythm.  No murmur heard.  Pulmonary/Chest: Effort normal and breath sounds normal. No respiratory distress. No wheezes, rales, or rhonchi  Abdominal: Soft. Bowel sounds are normal.  No tenderness. Abdomen distended.  Musculoskeletal: Normal range of motion. No edema.   Neurological: Alert and oriented to person, place, and time.   Skin: Skin is warm and dry.   Psychiatric: Normal mood and affect. Behavior is normal.      Laboratory:  Most Recent Data:  CBC:   Lab Results   Component Value Date    WBC 4.31 06/27/2022    HGB 13.7 (L) 06/27/2022    HCT 39.4 (L) 06/27/2022     06/27/2022    MCV 86 06/27/2022    RDW 12.8 06/27/2022     BMP:   Lab Results   Component Value Date     06/27/2022    K 4.3 06/27/2022     06/27/2022    CO2 25 06/27/2022    BUN 9 06/27/2022    CREATININE 0.7 06/27/2022     06/27/2022    CALCIUM 8.9 06/27/2022    MG 2.0 06/27/2022    PHOS 3.3 06/27/2022     Trended Lab Data:  Recent Labs   Lab 06/25/22  1409 06/25/22  1652 06/25/22  2052 06/26/22  0448 06/26/22  0632 06/26/22  1258 06/26/22  1807 06/27/22  0405   WBC 13.29*  --   --  6.12  --   --   --  4.31   HGB 12.8*  --   --  13.7*  --   --   --  13.7*   HCT 36.6*  --   --  38.9*  --   --   --  39.4*     --   --  194  --   --   --  161   MCV 86  --   --  84  --   --   --  86   RDW 12.2  --   --  12.0  --   --   --  12.8   NA  --  121*   < > 135*   < > 136 136 137   K  --  3.0*   < > 4.3   < > 4.2 4.0 4.3   CL  --  87*   < > 102   < > 101 103 105   CO2  --  21*   < > 24   < > 24 20* 25   BUN  --  9   < > 5*   < > 8 8 9   CREATININE  --  0.7   < > 0.7   < > 0.8 0.9 0.7   GLU  --  127*   < > 93   < > 107 116* 101   PROT  --  6.7  --   --   --   --   --   --    ALBUMIN  --  4.0  --   --   --   --   --   --    BILITOT  --  1.0  --   --   --   --   --   --    AST   --  91*  --   --   --   --   --   --    ALKPHOS  --  53*  --   --   --   --   --   --    ALT  --  54*  --   --   --   --   --   --     < > = values in this interval not displayed.     Other Results:    Significant Imaging: I have reviewed all pertinent imaging results/findings within the past 24 hours.    Assessment and Plan     Mr. Nicanor Solis is a 36 y.o. male who presented to [facility] on 6/25/2022 with AMS, seizure-like activity and diaphoresis.    Hospital Course:    Mr. Nicanor Solis was admitted to Hospital Medicine for further monitoring and treatment.    Active Hospital Problems    Diagnosis  POA    *Hyponatremia [E87.1]  Yes    Rhabdomyolysis [M62.82]  Yes    Bradycardia [R00.1]  Yes    Constipation [K59.00]  Yes    Leukocytosis [D72.829]  Yes    Lactic acidosis [E87.2]  Yes      Resolved Hospital Problems   No resolved problems to display.     Hyponatremia  -Likely due to increased water intake  -Pt is neurologically at baseline. Labs WNL. Problem resolved  -Discussed with pt drinking water over longer periods of time and to thirst    Leukocytosis  -Likely due to hyponatremia   -Labs WNL. Problem resolved    Lactic Acidosis  -Labs WNL. Problem resolved    Rhabdomyolysis  -Likely due to increased intensity of new workout routine  -CK slightly increased, but downtrending. No dark urine or other symptoms of concern. Problem well controlled  -Discussed limiting workout intensity for the next week    Constipation   -Pt moved bowels today. Problem moderately controlled  -Pt educated on need to increase dietary fiber intake  -Discharge with miralax    Bradycardia  -EKG showed occasional arrhythmias, but otherwise WNL  -Discharge with Holter monitor    Patient is ready for discharge.    Discharge Planning   PARAS: 6/27/2022     Code Status: Full Code   Discharge Plan A: Home, Home with family        Myles Garcia, MS3  [facility] [employer] [facility] [facility]

## 2022-06-27 NOTE — PROGRESS NOTES
PIVs removed without any difficulty. DSD applied.     Discharge instructions reviewed with patient and wife with opportunity for questions and answers.     Patient picking up RX at [facility].     Dc home/self care

## 2022-06-29 ENCOUNTER — TELEPHONE (OUTPATIENT)
Dept: PHARMACY | Facility: CLINIC | Age: 37
End: 2022-06-29

## 2022-06-30 LAB
BACTERIA BLD CULT: NORMAL
BACTERIA BLD CULT: NORMAL

## 2022-07-07 NOTE — PROGRESS NOTES
Subjective:       Patient ID: Nicanor Solis is a 36 y.o. male.    Chief Complaint: Hospital Follow Up    Pt here for f/u from hospital for symptomatic hyponatremia. Record reviewed. Pt with seizure-like activity and altered mental status after drinking large volume of free water to help with constipation. Sodium was 119 on initial check in ED. This corrected with 3% saline and then required DDVAP for overcorrection. Symptoms improved and he is now at baseline. Discharge Na was 137. He had mild rhabdo as well but unsure if from seizure activity or intense exercise he had been doing prior. Nevertheless, he is doing well now and feels well.    Review of Systems   Constitutional: Negative for fever.   Respiratory: Negative for shortness of breath.    Cardiovascular: Negative for chest pain.   Neurological: Negative for weakness.   Psychiatric/Behavioral: Negative for dysphoric mood.         Objective:      Physical Exam  Constitutional:       Appearance: Normal appearance. He is well-developed.   HENT:      Head: Normocephalic and atraumatic.   Eyes:      Extraocular Movements: Extraocular movements intact.      Pupils: Pupils are equal, round, and reactive to light.   Neck:      Thyroid: No thyromegaly.      Vascular: No carotid bruit.   Cardiovascular:      Rate and Rhythm: Normal rate and regular rhythm.      Heart sounds: Normal heart sounds, S1 normal and S2 normal. No murmur heard.  Pulmonary:      Effort: Pulmonary effort is normal.      Breath sounds: Normal breath sounds. No wheezing.   Abdominal:      General: Bowel sounds are normal.      Palpations: Abdomen is soft. There is no mass.      Tenderness: There is no abdominal tenderness.   Musculoskeletal:      Cervical back: Normal range of motion and neck supple.      Right lower leg: No edema.      Left lower leg: No edema.   Lymphadenopathy:      Cervical: No cervical adenopathy.   Neurological:      Mental Status: He is alert and oriented to person, place,  and time.      Cranial Nerves: No cranial nerve deficit.   Psychiatric:         Mood and Affect: Mood normal.         Behavior: Behavior normal.         Thought Content: Thought content normal.         Judgment: Judgment normal.         Assessment:       Problem List Items Addressed This Visit        Renal/    Hyponatremia - Primary          Plan:       1. hypoNa resolved; educated on proper hydration including solute added hydration solutions  2. Repeat BMP now  3. Strategies for addressing constipation also reviewed with pt

## 2022-07-08 ENCOUNTER — OFFICE VISIT (OUTPATIENT)
Dept: INTERNAL MEDICINE | Facility: CLINIC | Age: 37
End: 2022-07-08
Payer: COMMERCIAL

## 2022-07-08 ENCOUNTER — LAB VISIT (OUTPATIENT)
Dept: LAB | Facility: OTHER | Age: 37
End: 2022-07-08
Attending: INTERNAL MEDICINE
Payer: COMMERCIAL

## 2022-07-08 VITALS
OXYGEN SATURATION: 97 % | HEIGHT: 67 IN | DIASTOLIC BLOOD PRESSURE: 84 MMHG | WEIGHT: 194.75 LBS | HEART RATE: 57 BPM | BODY MASS INDEX: 30.57 KG/M2 | SYSTOLIC BLOOD PRESSURE: 128 MMHG

## 2022-07-08 DIAGNOSIS — E87.1 HYPONATREMIA: ICD-10-CM

## 2022-07-08 DIAGNOSIS — E87.1 HYPONATREMIA: Primary | ICD-10-CM

## 2022-07-08 LAB
ANION GAP SERPL CALC-SCNC: 9 MMOL/L (ref 8–16)
BUN SERPL-MCNC: 11 MG/DL (ref 6–20)
CALCIUM SERPL-MCNC: 9.3 MG/DL (ref 8.7–10.5)
CHLORIDE SERPL-SCNC: 104 MMOL/L (ref 95–110)
CO2 SERPL-SCNC: 27 MMOL/L (ref 23–29)
CREAT SERPL-MCNC: 1 MG/DL (ref 0.5–1.4)
EST. GFR  (AFRICAN AMERICAN): >60 ML/MIN/1.73 M^2
EST. GFR  (NON AFRICAN AMERICAN): >60 ML/MIN/1.73 M^2
GLUCOSE SERPL-MCNC: 89 MG/DL (ref 70–110)
POTASSIUM SERPL-SCNC: 5.1 MMOL/L (ref 3.5–5.1)
SODIUM SERPL-SCNC: 140 MMOL/L (ref 136–145)

## 2022-07-08 PROCEDURE — 99214 OFFICE O/P EST MOD 30 MIN: CPT | Mod: S$GLB,,, | Performed by: INTERNAL MEDICINE

## 2022-07-08 PROCEDURE — 99999 PR PBB SHADOW E&M-EST. PATIENT-LVL III: CPT | Mod: PBBFAC,,, | Performed by: INTERNAL MEDICINE

## 2022-07-08 PROCEDURE — 99214 PR OFFICE/OUTPT VISIT, EST, LEVL IV, 30-39 MIN: ICD-10-PCS | Mod: S$GLB,,, | Performed by: INTERNAL MEDICINE

## 2022-07-08 PROCEDURE — 80048 BASIC METABOLIC PNL TOTAL CA: CPT | Performed by: INTERNAL MEDICINE

## 2022-07-08 PROCEDURE — 99999 PR PBB SHADOW E&M-EST. PATIENT-LVL III: ICD-10-PCS | Mod: PBBFAC,,, | Performed by: INTERNAL MEDICINE

## 2022-07-08 PROCEDURE — 36415 COLL VENOUS BLD VENIPUNCTURE: CPT | Performed by: INTERNAL MEDICINE

## 2022-09-12 ENCOUNTER — OCCUPATIONAL HEALTH (OUTPATIENT)
Dept: URGENT CARE | Facility: CLINIC | Age: 37
End: 2022-09-12

## 2022-09-12 DIAGNOSIS — Z02.1 PRE-EMPLOYMENT EXAMINATION: Primary | ICD-10-CM

## 2022-09-12 PROCEDURE — 99499 UNLISTED E&M SERVICE: CPT | Mod: S$GLB,,, | Performed by: EMERGENCY MEDICINE

## 2022-09-12 PROCEDURE — 99499 PHYSICAL, BASIC COMPLEXITY: ICD-10-PCS | Mod: S$GLB,,, | Performed by: EMERGENCY MEDICINE

## 2023-02-03 ENCOUNTER — LAB VISIT (OUTPATIENT)
Dept: LAB | Facility: OTHER | Age: 38
End: 2023-02-03
Attending: INTERNAL MEDICINE
Payer: COMMERCIAL

## 2023-02-03 DIAGNOSIS — Z00.00 WELLNESS EXAMINATION: ICD-10-CM

## 2023-02-03 LAB
ALBUMIN SERPL BCP-MCNC: 4.4 G/DL (ref 3.5–5.2)
ALP SERPL-CCNC: 48 U/L (ref 55–135)
ALT SERPL W/O P-5'-P-CCNC: 30 U/L (ref 10–44)
ANION GAP SERPL CALC-SCNC: 4 MMOL/L (ref 8–16)
AST SERPL-CCNC: 28 U/L (ref 10–40)
BASOPHILS # BLD AUTO: 0.04 K/UL (ref 0–0.2)
BASOPHILS NFR BLD: 0.7 % (ref 0–1.9)
BILIRUB SERPL-MCNC: 0.7 MG/DL (ref 0.1–1)
BUN SERPL-MCNC: 10 MG/DL (ref 6–20)
CALCIUM SERPL-MCNC: 9.6 MG/DL (ref 8.7–10.5)
CHLORIDE SERPL-SCNC: 105 MMOL/L (ref 95–110)
CHOLEST SERPL-MCNC: 190 MG/DL (ref 120–199)
CHOLEST/HDLC SERPL: 5.6 {RATIO} (ref 2–5)
CO2 SERPL-SCNC: 30 MMOL/L (ref 23–29)
CREAT SERPL-MCNC: 0.9 MG/DL (ref 0.5–1.4)
DIFFERENTIAL METHOD: ABNORMAL
EOSINOPHIL # BLD AUTO: 0.5 K/UL (ref 0–0.5)
EOSINOPHIL NFR BLD: 9 % (ref 0–8)
ERYTHROCYTE [DISTWIDTH] IN BLOOD BY AUTOMATED COUNT: 13.2 % (ref 11.5–14.5)
EST. GFR  (NO RACE VARIABLE): >60 ML/MIN/1.73 M^2
GLUCOSE SERPL-MCNC: 98 MG/DL (ref 70–110)
HCT VFR BLD AUTO: 43.7 % (ref 40–54)
HDLC SERPL-MCNC: 34 MG/DL (ref 40–75)
HDLC SERPL: 17.9 % (ref 20–50)
HGB BLD-MCNC: 14.3 G/DL (ref 14–18)
IMM GRANULOCYTES # BLD AUTO: 0.01 K/UL (ref 0–0.04)
IMM GRANULOCYTES NFR BLD AUTO: 0.2 % (ref 0–0.5)
LDLC SERPL CALC-MCNC: 118.4 MG/DL (ref 63–159)
LYMPHOCYTES # BLD AUTO: 2.3 K/UL (ref 1–4.8)
LYMPHOCYTES NFR BLD: 42 % (ref 18–48)
MCH RBC QN AUTO: 28.9 PG (ref 27–31)
MCHC RBC AUTO-ENTMCNC: 32.7 G/DL (ref 32–36)
MCV RBC AUTO: 88 FL (ref 82–98)
MONOCYTES # BLD AUTO: 0.5 K/UL (ref 0.3–1)
MONOCYTES NFR BLD: 9 % (ref 4–15)
NEUTROPHILS # BLD AUTO: 2.1 K/UL (ref 1.8–7.7)
NEUTROPHILS NFR BLD: 39.1 % (ref 38–73)
NONHDLC SERPL-MCNC: 156 MG/DL
NRBC BLD-RTO: 0 /100 WBC
PLATELET # BLD AUTO: 185 K/UL (ref 150–450)
PMV BLD AUTO: 9.9 FL (ref 9.2–12.9)
POTASSIUM SERPL-SCNC: 4.6 MMOL/L (ref 3.5–5.1)
PROT SERPL-MCNC: 7.7 G/DL (ref 6–8.4)
RBC # BLD AUTO: 4.95 M/UL (ref 4.6–6.2)
SODIUM SERPL-SCNC: 139 MMOL/L (ref 136–145)
TRIGL SERPL-MCNC: 188 MG/DL (ref 30–150)
WBC # BLD AUTO: 5.45 K/UL (ref 3.9–12.7)

## 2023-02-03 PROCEDURE — 36415 COLL VENOUS BLD VENIPUNCTURE: CPT | Performed by: INTERNAL MEDICINE

## 2023-02-03 PROCEDURE — 85025 COMPLETE CBC W/AUTO DIFF WBC: CPT | Performed by: INTERNAL MEDICINE

## 2023-02-03 PROCEDURE — 80061 LIPID PANEL: CPT | Performed by: INTERNAL MEDICINE

## 2023-02-03 PROCEDURE — 80053 COMPREHEN METABOLIC PANEL: CPT | Performed by: INTERNAL MEDICINE

## 2023-10-05 ENCOUNTER — OFFICE VISIT (OUTPATIENT)
Dept: URGENT CARE | Facility: CLINIC | Age: 38
End: 2023-10-05
Payer: COMMERCIAL

## 2023-10-05 VITALS
WEIGHT: 194 LBS | TEMPERATURE: 98 F | BODY MASS INDEX: 30.45 KG/M2 | DIASTOLIC BLOOD PRESSURE: 78 MMHG | SYSTOLIC BLOOD PRESSURE: 135 MMHG | HEIGHT: 67 IN | RESPIRATION RATE: 18 BRPM | HEART RATE: 70 BPM | OXYGEN SATURATION: 98 %

## 2023-10-05 DIAGNOSIS — B96.89 BACTERIAL CONJUNCTIVITIS OF BOTH EYES: Primary | ICD-10-CM

## 2023-10-05 DIAGNOSIS — H10.9 BACTERIAL CONJUNCTIVITIS OF BOTH EYES: Primary | ICD-10-CM

## 2023-10-05 PROCEDURE — 99203 PR OFFICE/OUTPT VISIT, NEW, LEVL III, 30-44 MIN: ICD-10-PCS | Mod: S$GLB,,,

## 2023-10-05 PROCEDURE — 99203 OFFICE O/P NEW LOW 30 MIN: CPT | Mod: S$GLB,,,

## 2023-10-05 RX ORDER — ERYTHROMYCIN 5 MG/G
OINTMENT OPHTHALMIC 3 TIMES DAILY
Qty: 3.5 G | Refills: 0 | Status: SHIPPED | OUTPATIENT
Start: 2023-10-05 | End: 2023-10-12

## 2023-10-05 RX ORDER — ERYTHROMYCIN 5 MG/G
OINTMENT OPHTHALMIC EVERY 4 HOURS
Status: DISCONTINUED | OUTPATIENT
Start: 2023-10-05 | End: 2023-10-05

## 2023-10-05 NOTE — PATIENT INSTRUCTIONS
Eye ointment 3 times a day for 7 days.   Over the counter eye drops for itching sensation as needed.   If no improvement, return to clinic or follow up with PCP.

## 2023-10-05 NOTE — LETTER
October 5, 2023      [facility]  [address]  [place] [address]  Phone: [phone]  Fax: [phone]       Patient: Nicanor Solis   YOB: 1985  Date of Visit: 10/05/2023    To Whom It May Concern:    Denisha Solis  was at [facility] on 10/05/2023. The patient may return to work/school on October 5th with no restrictions. If you have any questions or concerns, or if I can be of further assistance, please do not hesitate to contact me.    Sincerely,    Jameson Zelaya PA-C

## 2023-10-05 NOTE — PROGRESS NOTES
"Subjective:      Patient ID: Nicanor Solis is a 37 y.o. male.    Vitals:  height is 5' 7" (1.702 m) and weight is 88 kg (194 lb). His oral temperature is 98 °F (36.7 °C). His blood pressure is 135/78 and his pulse is 70. His respiration is 18 and oxygen saturation is 98%.     Chief Complaint: Conjunctivitis (bilateral)    This is a 37 y.o. male who presents today with a chief complaint of  bilateral conjunctivitis. Pt states daughter had bacterial conjunctivitis last week. Denies contact use. Pt endorses itching, eye redness, and discharge from B/L eyes. Denies any change in vision or any eye pain.     Conjunctivitis  This is a new problem. The current episode started yesterday. The problem occurs constantly. The problem has been unchanged. Pertinent negatives include no abdominal pain, anorexia, arthralgias, change in bowel habit, chest pain, chills, congestion, coughing, diaphoresis, fatigue, fever, headaches, joint swelling, myalgias, nausea, neck pain, numbness, rash, sore throat, swollen glands, urinary symptoms, vertigo, visual change, vomiting or weakness. Nothing aggravates the symptoms. He has tried nothing for the symptoms. The treatment provided no relief.       Constitution: Negative for chills, sweating, fatigue and fever.   HENT:  Negative for congestion, postnasal drip, sinus pain, sinus pressure and sore throat.    Neck: Negative for neck pain and painful lymph nodes.   Cardiovascular:  Negative for chest pain.   Eyes:  Positive for eye discharge (crusted, yellow green discharge), eye itching and eye redness. Negative for eye trauma, foreign body in eye, eye pain, photophobia, vision loss, double vision, blurred vision and eyelid swelling.   Respiratory:  Negative for cough and shortness of breath.    Gastrointestinal:  Negative for abdominal pain, nausea and vomiting.   Musculoskeletal:  Negative for joint pain, joint swelling and muscle ache.   Skin:  Negative for rash. "   Allergic/Immunologic: Negative for seasonal allergies, food allergies, chronic cough and sneezing.   Neurological:  Negative for history of vertigo, headaches and numbness.   Hematologic/Lymphatic: Negative for swollen lymph nodes.      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 (Patient not taking: Reported on 7/8/2022) 90 tablet 3    LIDOcaine (LIDODERM) 5 % Place 1 patch onto the skin daily as needed (apply to lower back prn pain). Remove & Discard patch within 12 hours or as directed by MD (Patient not taking: Reported on 7/8/2022) 30 patch 1    polyethylene glycol (GLYCOLAX) 17 gram/dose powder Dissolve one capful (17 g) in liquid and take by mouth daily as needed (if no BM for 2 days). (Patient not taking: Reported on 7/8/2022) 510 g 1     Current Facility-Administered Medications   Medication Dose Route Frequency Provider Last Rate Last Admin    erythromycin 5 mg/gram (0.5 %) ophthalmic ointment   Both Eyes Q4H Jameson Zelaya PA-C           Review of patient's allergies indicates:   Allergen Reactions    Penicillins Other (See Comments)     Pt was testing at birth      Objective:     Physical Exam   Constitutional: He is oriented to person, place, and time. He  appears well-developed.   HENT:   Head: Normocephalic and atraumatic.   Ears:   Right Ear: External ear normal.   Left Ear: External ear normal.   Nose: Nose normal.   Mouth/Throat: Oropharynx is clear and moist.   Eyes: EOM and lids are normal. Pupils are equal, round, and reactive to light. Right eye exhibits no chemosis, no discharge and no hordeolum. No foreign body present in the right eye. Left eye exhibits discharge (yellow-green discharge). Left eye exhibits no chemosis and no hordeolum. No foreign body present in the left eye. Right conjunctiva is injected. Right conjunctiva has no hemorrhage. Left conjunctiva is injected. Left conjunctiva has no hemorrhage. Right eye exhibits normal extraocular motion. Left eye exhibits normal extraocular motion. periorbital hyperpigmentation     Comments: B/L redness    Neck: Trachea normal and phonation normal. Neck supple.   Musculoskeletal: Normal range of motion.         General: Normal range of motion.   Neurological: He is alert and oriented to person, place, and time.   Skin: Skin is warm, dry and intact.   Psychiatric: His speech is normal and behavior is normal. Judgment and thought content normal.   Nursing note and vitals reviewed.    Assessment:     1. Bacterial conjunctivitis of both eyes        Plan:   I have reviewed the patient chart and pertinent past imaging/labs.     Bacterial conjunctivitis of both eyes  -     erythromycin 5 mg/gram (0.5 %) ophthalmic ointment      Patient Instructions   Eye ointment 3 times a day for 7 days.   Over the counter eye drops for itching sensation as needed.   If no improvement, return to clinic or follow up with PCP.

## 2023-10-06 ENCOUNTER — TELEPHONE (OUTPATIENT)
Dept: URGENT CARE | Facility: CLINIC | Age: 38
End: 2023-10-06
Payer: COMMERCIAL

## 2024-05-30 ENCOUNTER — LAB VISIT (OUTPATIENT)
Dept: LAB | Facility: HOSPITAL | Age: 39
End: 2024-05-30
Payer: COMMERCIAL

## 2024-05-30 ENCOUNTER — NURSE TRIAGE (OUTPATIENT)
Dept: ADMINISTRATIVE | Facility: CLINIC | Age: 39
End: 2024-05-30
Payer: COMMERCIAL

## 2024-05-30 ENCOUNTER — OFFICE VISIT (OUTPATIENT)
Dept: INTERNAL MEDICINE | Facility: CLINIC | Age: 39
End: 2024-05-30
Payer: COMMERCIAL

## 2024-05-30 VITALS
HEIGHT: 67 IN | HEART RATE: 59 BPM | OXYGEN SATURATION: 98 % | DIASTOLIC BLOOD PRESSURE: 78 MMHG | SYSTOLIC BLOOD PRESSURE: 122 MMHG | BODY MASS INDEX: 29.55 KG/M2 | WEIGHT: 188.25 LBS

## 2024-05-30 DIAGNOSIS — Z00.00 ANNUAL PHYSICAL EXAM: ICD-10-CM

## 2024-05-30 DIAGNOSIS — R19.5 CLAY-COLORED STOOLS: ICD-10-CM

## 2024-05-30 DIAGNOSIS — Z00.00 ANNUAL PHYSICAL EXAM: Primary | ICD-10-CM

## 2024-05-30 LAB
ALBUMIN SERPL BCP-MCNC: 4.6 G/DL (ref 3.5–5.2)
ALP SERPL-CCNC: 54 U/L (ref 55–135)
ALT SERPL W/O P-5'-P-CCNC: 34 U/L (ref 10–44)
ANION GAP SERPL CALC-SCNC: 11 MMOL/L (ref 8–16)
AST SERPL-CCNC: 29 U/L (ref 10–40)
BASOPHILS # BLD AUTO: 0.03 K/UL (ref 0–0.2)
BASOPHILS NFR BLD: 0.5 % (ref 0–1.9)
BILIRUB SERPL-MCNC: 0.4 MG/DL (ref 0.1–1)
BILIRUB UR QL STRIP: NEGATIVE
BUN SERPL-MCNC: 12 MG/DL (ref 6–20)
CALCIUM SERPL-MCNC: 9.7 MG/DL (ref 8.7–10.5)
CHLORIDE SERPL-SCNC: 103 MMOL/L (ref 95–110)
CHOLEST SERPL-MCNC: 192 MG/DL (ref 120–199)
CHOLEST/HDLC SERPL: 5.2 {RATIO} (ref 2–5)
CLARITY UR REFRACT.AUTO: CLEAR
CO2 SERPL-SCNC: 23 MMOL/L (ref 23–29)
COLOR UR AUTO: YELLOW
CREAT SERPL-MCNC: 0.9 MG/DL (ref 0.5–1.4)
DIFFERENTIAL METHOD BLD: NORMAL
EOSINOPHIL # BLD AUTO: 0.2 K/UL (ref 0–0.5)
EOSINOPHIL NFR BLD: 3.7 % (ref 0–8)
ERYTHROCYTE [DISTWIDTH] IN BLOOD BY AUTOMATED COUNT: 13.2 % (ref 11.5–14.5)
EST. GFR  (NO RACE VARIABLE): >60 ML/MIN/1.73 M^2
ESTIMATED AVG GLUCOSE: 105 MG/DL (ref 68–131)
GLUCOSE SERPL-MCNC: 70 MG/DL (ref 70–110)
GLUCOSE UR QL STRIP: NEGATIVE
HBA1C MFR BLD: 5.3 % (ref 4–5.6)
HCT VFR BLD AUTO: 44 % (ref 40–54)
HDLC SERPL-MCNC: 37 MG/DL (ref 40–75)
HDLC SERPL: 19.3 % (ref 20–50)
HGB BLD-MCNC: 14.6 G/DL (ref 14–18)
HGB UR QL STRIP: NEGATIVE
IMM GRANULOCYTES # BLD AUTO: 0.02 K/UL (ref 0–0.04)
IMM GRANULOCYTES NFR BLD AUTO: 0.3 % (ref 0–0.5)
KETONES UR QL STRIP: NEGATIVE
LDLC SERPL CALC-MCNC: 112.8 MG/DL (ref 63–159)
LEUKOCYTE ESTERASE UR QL STRIP: NEGATIVE
LYMPHOCYTES # BLD AUTO: 2.7 K/UL (ref 1–4.8)
LYMPHOCYTES NFR BLD: 41 % (ref 18–48)
MCH RBC QN AUTO: 29.8 PG (ref 27–31)
MCHC RBC AUTO-ENTMCNC: 33.2 G/DL (ref 32–36)
MCV RBC AUTO: 90 FL (ref 82–98)
MONOCYTES # BLD AUTO: 0.5 K/UL (ref 0.3–1)
MONOCYTES NFR BLD: 7.8 % (ref 4–15)
NEUTROPHILS # BLD AUTO: 3.1 K/UL (ref 1.8–7.7)
NEUTROPHILS NFR BLD: 46.7 % (ref 38–73)
NITRITE UR QL STRIP: NEGATIVE
NONHDLC SERPL-MCNC: 155 MG/DL
NRBC BLD-RTO: 0 /100 WBC
PH UR STRIP: 6 [PH] (ref 5–8)
PLATELET # BLD AUTO: 226 K/UL (ref 150–450)
PMV BLD AUTO: 11.1 FL (ref 9.2–12.9)
POTASSIUM SERPL-SCNC: 3.9 MMOL/L (ref 3.5–5.1)
PROT SERPL-MCNC: 7.9 G/DL (ref 6–8.4)
PROT UR QL STRIP: NEGATIVE
RBC # BLD AUTO: 4.9 M/UL (ref 4.6–6.2)
SODIUM SERPL-SCNC: 137 MMOL/L (ref 136–145)
SP GR UR STRIP: 1.01 (ref 1–1.03)
TRIGL SERPL-MCNC: 211 MG/DL (ref 30–150)
TSH SERPL DL<=0.005 MIU/L-ACNC: 0.88 UIU/ML (ref 0.4–4)
URN SPEC COLLECT METH UR: NORMAL
WBC # BLD AUTO: 6.52 K/UL (ref 3.9–12.7)

## 2024-05-30 PROCEDURE — 81003 URINALYSIS AUTO W/O SCOPE: CPT

## 2024-05-30 PROCEDURE — 85025 COMPLETE CBC W/AUTO DIFF WBC: CPT

## 2024-05-30 PROCEDURE — 3008F BODY MASS INDEX DOCD: CPT | Mod: CPTII,S$GLB,,

## 2024-05-30 PROCEDURE — 99214 OFFICE O/P EST MOD 30 MIN: CPT | Mod: S$GLB,,,

## 2024-05-30 PROCEDURE — 3074F SYST BP LT 130 MM HG: CPT | Mod: CPTII,S$GLB,,

## 2024-05-30 PROCEDURE — 36415 COLL VENOUS BLD VENIPUNCTURE: CPT

## 2024-05-30 PROCEDURE — 83036 HEMOGLOBIN GLYCOSYLATED A1C: CPT

## 2024-05-30 PROCEDURE — 1159F MED LIST DOCD IN RCRD: CPT | Mod: CPTII,S$GLB,,

## 2024-05-30 PROCEDURE — 3078F DIAST BP <80 MM HG: CPT | Mod: CPTII,S$GLB,,

## 2024-05-30 PROCEDURE — 84443 ASSAY THYROID STIM HORMONE: CPT

## 2024-05-30 PROCEDURE — 1160F RVW MEDS BY RX/DR IN RCRD: CPT | Mod: CPTII,S$GLB,,

## 2024-05-30 PROCEDURE — 80061 LIPID PANEL: CPT

## 2024-05-30 PROCEDURE — 99999 PR PBB SHADOW E&M-EST. PATIENT-LVL IV: CPT | Mod: PBBFAC,,,

## 2024-05-30 PROCEDURE — 80053 COMPREHEN METABOLIC PANEL: CPT

## 2024-05-30 NOTE — PROGRESS NOTES
INTERNAL MEDICINE PROGRESS/URGENT CARE NOTE    Patient: Nicanor Solis  : 1985  MRN: [id number]  PCP: Ford Barrera MD    CHIEF COMPLAINT     Chief Complaint   Patient presents with    Stool Color Change       HPI     Nicanor is a 38 y.o. male with ADHD, chronic LBP, constipation, rhabdomyolysis who presents for an urgent visit today with c/o change in stool color. He states his stool has been a light tan/arlet color. Consistency has not changed, about bristol #4. Started yesterday. No pain or discomfort. He states yesterday he had one episode of the arlet-colored stool and it happened again today. Denies fevers, chills, abd pain, n/v/d. Yesterday, he ate some chicken, a cutie orange for breakfast. For lunch, he had a sampling of things: ate curried chicken, chocolate rice pudding, dumplings with soy sauce, chicken fried rice, a pastry, shrimp corn puff, and Welsh tea. For dinner, he ate bowtie pasta with lemon dressing, asparagus, peas, mozzarella, kielbasa. He states he regularly takes 2 tbsp of raw form psyllium husk at night with water. He states his stools are normally. [place] stool chart #3-4.    Also has c/o recurring LBP that has flared recently.     He states he takes ibuprofen a couple times a month for headache.     He has not hx of drug use or IV drug use. Has a hx of occasional tobacco use but has quit over 18 years ago. Unable to report duration and amount of cigarette use. Drinks alcohol occasionally- about 5-6 drinks/mos.    Health Maintenance:  Colon Cancer Screening  Screening not indicated by patient's age & family history  Lung Cancer Screening  Former smoker - quit date:   Pack-Years: Occasional casual smoker, unsure of duration.  Prostate Cancer Screening  Not indicated by age or history  ASCVD Risk  The ASCVD Risk score (Leandra SAUCEDO, et al., 2019) failed to calculate for the following reasons:    The 2019 ASCVD risk score is only valid for ages 40 to 79   Lab Results    Component Value Date    CHOL 190 02/03/2023    HDL 34 (L) 02/03/2023    TRIG 188 (H) 02/03/2023     Statin rx: no  Diet  The patient has been trying to lose weight through a healthy diet and exercise program.  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 Remove & Discard patch within 12 hours or as directed by MD, Disp: 30 patch, Rfl: 1    polyethylene glycol (GLYCOLAX) 17 gram/dose powder, Dissolve one capful (17 g) in liquid and take by mouth daily as needed (if no BM for 2 days)., Disp: 510 g, Rfl: 1    cetirizine (ZYRTEC) 10 MG tablet, Take 1 tablet (10 mg total) by mouth once daily. (Patient not taking: Reported on 7/8/2022), Disp: 90 tablet, Rfl: 3     Review of Systems:  Review of Systems   Constitutional:  Negative for fever.   Respiratory:  Negative for chest tightness and shortness of breath.    Cardiovascular:  Negative for chest pain.   Gastrointestinal:  Negative for abdominal pain, blood in stool, diarrhea, nausea and vomiting.   Musculoskeletal:  Negative for myalgias.   Neurological:  Negative for dizziness, weakness and headaches.   Psychiatric/Behavioral:  Negative for suicidal ideas.          PHYSICAL EXAM     Vitals:    05/30/24 1041   BP: 122/78   BP Location: Left arm   Patient Position:  "Sitting   BP Method: Large (Manual)   Pulse: (!) 59   SpO2: 98%   Weight: 85.4 kg (188 lb 4.4 oz)   Height: 5' 7" (1.702 m)      Body mass index is 29.49 kg/m².     Physical Exam  Constitutional:       General: He is not in acute distress.     Appearance: Normal appearance. He is not ill-appearing, toxic-appearing or diaphoretic.   HENT:      Head: Normocephalic.   Eyes:      Extraocular Movements: Extraocular movements intact.      Pupils: Pupils are equal, round, and reactive to light.   Cardiovascular:      Rate and Rhythm: Normal rate and regular rhythm.   Pulmonary:      Effort: Pulmonary effort is normal. No respiratory distress.      Breath sounds: Normal breath sounds. No wheezing.   Abdominal:      General: Bowel sounds are normal. There is no distension.      Palpations: Abdomen is soft. There is no mass.      Tenderness: There is no abdominal tenderness. There is no right CVA tenderness, left CVA tenderness, guarding or rebound.      Hernia: No hernia is present.   Musculoskeletal:         General: Normal range of motion.      Cervical back: Normal range of motion and neck supple. No rigidity or tenderness.   Lymphadenopathy:      Cervical: No cervical adenopathy.   Skin:     General: Skin is warm and dry.   Neurological:      General: No focal deficit present.      Mental Status: He is alert and oriented to person, place, and time.         LABS     Lab Results   Component Value Date    HGBA1C 5.4 12/08/2014     CMP  Sodium   Date Value Ref Range Status   02/03/2023 139 136 - 145 mmol/L Final     Potassium   Date Value Ref Range Status   02/03/2023 4.6 3.5 - 5.1 mmol/L Final     Chloride   Date Value Ref Range Status   02/03/2023 105 95 - 110 mmol/L Final     CO2   Date Value Ref Range Status   02/03/2023 30 (H) 23 - 29 mmol/L Final     Glucose   Date Value Ref Range Status   02/03/2023 98 70 - 110 mg/dL Final     BUN   Date Value Ref Range Status   02/03/2023 10 6 - 20 mg/dL Final     Creatinine   Date " Value Ref Range Status   02/03/2023 0.9 0.5 - 1.4 mg/dL Final     Calcium   Date Value Ref Range Status   02/03/2023 9.6 8.7 - 10.5 mg/dL Final     Total Protein   Date Value Ref Range Status   02/03/2023 7.7 6.0 - 8.4 g/dL Final     Albumin   Date Value Ref Range Status   02/03/2023 4.4 3.5 - 5.2 g/dL Final     Total Bilirubin   Date Value Ref Range Status   02/03/2023 0.7 0.1 - 1.0 mg/dL Final     Comment:     For infants and newborns, interpretation of results should be based  on gestational age, weight and in agreement with clinical  observations.    Premature Infant recommended reference ranges:  Up to 24 hours.............<8.0 mg/dL  Up to 48 hours............<12.0 mg/dL  3-5 days..................<15.0 mg/dL  6-29 days.................<15.0 mg/dL       Alkaline Phosphatase   Date Value Ref Range Status   02/03/2023 48 (L) 55 - 135 U/L Final     AST   Date Value Ref Range Status   02/03/2023 28 10 - 40 U/L Final     ALT   Date Value Ref Range Status   02/03/2023 30 10 - 44 U/L Final     Anion Gap   Date Value Ref Range Status   02/03/2023 4 (L) 8 - 16 mmol/L Final     eGFR if    Date Value Ref Range Status   07/08/2022 >60 >60 mL/min/1.73 m^2 Final     eGFR if non    Date Value Ref Range Status   07/08/2022 >60 >60 mL/min/1.73 m^2 Final     Comment:     Calculation used to obtain the estimated glomerular filtration  rate (eGFR) is the CKD-EPI equation.        Lab Results   Component Value Date    WBC 5.45 02/03/2023    HGB 14.3 02/03/2023    HCT 43.7 02/03/2023    MCV 88 02/03/2023     02/03/2023     Lab Results   Component Value Date    CHOL 190 02/03/2023    CHOL 187 02/03/2022    CHOL 185 09/15/2017     Lab Results   Component Value Date    HDL 34 (L) 02/03/2023    HDL 39 (L) 02/03/2022    HDL 36 (L) 09/15/2017     Lab Results   Component Value Date    LDLCALC 118.4 02/03/2023    LDLCALC 109.8 02/03/2022    LDLCALC 116.6 09/15/2017     Lab Results   Component Value  Date    TRIG 188 (H) 02/03/2023    TRIG 191 (H) 02/03/2022    TRIG 162 (H) 09/15/2017     Lab Results   Component Value Date    CHOLHDL 17.9 (L) 02/03/2023    CHOLHDL 20.9 02/03/2022    CHOLHDL 19.5 (L) 09/15/2017     Lab Results   Component Value Date    TSH 0.821 12/08/2014       ASSESSMENT & PLAN       1. Annual physical exam  -     CBC Auto Differential; Future; Expected date: 05/30/2024  -     Comprehensive Metabolic Panel; Future; Expected date: 05/30/2024  -     Lipid Panel; Future; Expected date: 05/30/2024  -     TSH; Future; Expected date: 05/30/2024  -     Hemoglobin A1C; Future; Expected date: 05/30/2024  -     Urinalysis    2. Kunal-colored stools  -     Ambulatory referral/consult to Gastroenterology; Future; Expected date: 06/06/2024     PE and VSS. Patient with change in stool color. Will screen labs. Pt to keep diary if this continues to occur. Referral placed to GI in the event that this continues and does not improve. Health Maint measures reviewed. Labs ordered. Hep C and Hiv screenings completed 2022 and wnl.    Follow up if symptoms worsen or fail to improve.    Patient was counseled on when to seek emergent care. Patient's plan/treatment was discussed including medications and possible side effects. Verbalized understanding of all instructions.            OSMIN Mendez, FNP-C  Department of Internal Medicine  [facility]

## 2024-05-30 NOTE — TELEPHONE ENCOUNTER
Pt calling with c/o stool color change that its light tan to gray and he had no other explanation and triaged and care advice to be seen within 24 hours. Pt was able to get appt today within 2 hours and will make that appt and call us back if any other questions or concerns. No PCP with [facility]                  Reason for Disposition   [1] Stool is light gray or whitish AND [2] unexplained    Additional Information   Negative: Patient sounds very sick or weak to the triager    Protocols used: Stools - Unusual Color-A-AH

## 2024-06-26 ENCOUNTER — OFFICE VISIT (OUTPATIENT)
Dept: GASTROENTEROLOGY | Facility: CLINIC | Age: 39
End: 2024-06-26
Payer: COMMERCIAL

## 2024-06-26 VITALS — BODY MASS INDEX: 30.35 KG/M2 | WEIGHT: 193.81 LBS

## 2024-06-26 DIAGNOSIS — R19.8 ABNORMAL BOWEL MOVEMENT: Primary | ICD-10-CM

## 2024-06-26 DIAGNOSIS — Z75.8 DOES NOT HAVE PRIMARY CARE PROVIDER: ICD-10-CM

## 2024-06-26 PROCEDURE — 99999 PR PBB SHADOW E&M-EST. PATIENT-LVL III: CPT | Mod: PBBFAC,,,

## 2024-06-26 PROCEDURE — 99204 OFFICE O/P NEW MOD 45 MIN: CPT | Mod: S$GLB,,,

## 2024-06-26 PROCEDURE — 3044F HG A1C LEVEL LT 7.0%: CPT | Mod: CPTII,S$GLB,,

## 2024-06-26 PROCEDURE — 1159F MED LIST DOCD IN RCRD: CPT | Mod: CPTII,S$GLB,,

## 2024-06-26 PROCEDURE — 3008F BODY MASS INDEX DOCD: CPT | Mod: CPTII,S$GLB,,

## 2024-06-26 NOTE — PROGRESS NOTES
"     GASTROENTEROLOGY CLINIC NOTE    Reason for visit: The primary encounter diagnosis was Change in bowel movement. A diagnosis of Does not have primary care provider was also pertinent to this visit.  Referring provider/PCP: Ford Barrera MD    HPI:  Nicanor Solis is a 38 y.o. male here today for abnormal BM. He reports normally having about 1-2 BM/day, normal in color and consistency. Recently has had 1 occurrence of arlet colored stool and another occurrence of "rope" like stool. He reports no further episodes of arlet colored stool. Other BM looked like it contained rope particles, not rope like in shape. He does report taking daily fiber supplement. No abd pain, no N/V, no blood in stool. No known GI FH.     Prior Endoscopy:  EGD:  Colon:    (Portions of this note were dictated using voice recognition software and may contain dictation related errors in spelling/grammar/syntax not found on text review)    Review of Systems   Gastrointestinal:  Negative for abdominal pain, blood in stool, constipation and diarrhea.       Past Medical History: has a past medical history of ADHD (attention deficit hyperactivity disorder).    Past Surgical History: has a past surgical history that includes Abdominal hernia repair and Tracheoesophageal fistula closure.    Home medications:   Current Outpatient Medications on File Prior to Visit   Medication Sig Dispense Refill    cetirizine (ZYRTEC) 10 MG tablet Take 1 tablet (10 mg total) by mouth once daily. (Patient not taking: Reported on 7/8/2022) 90 tablet 3    LIDOcaine (LIDODERM) 5 % Place 1 patch onto the skin daily as needed (apply to lower back prn pain). Remove & Discard patch within 12 hours or as directed by MD (Patient not taking: Reported on 6/26/2024) 30 patch 1    polyethylene glycol (GLYCOLAX) 17 gram/dose powder Dissolve one capful (17 g) in liquid and take by mouth daily as needed (if no BM for 2 days). (Patient not taking: Reported on 6/26/2024) " 510 g 1    [DISCONTINUED] hydrocortisone 2.5 % cream Apply topically 2 (two) times daily. 20 g 0     No current facility-administered medications on file prior to visit.       Vital signs:  Wt 87.9 kg (193 lb 12.6 oz)   BMI 30.35 kg/m²     Physical Exam  Constitutional:       Appearance: Normal appearance. He is obese.   Abdominal:      General: There is no distension.   Neurological:      Mental Status: He is alert.       I have reviewed associated labs, imaging and notes.     Assessment:  1. Change in bowel movement    2. Does not have primary care provider    Normal BM's, 1-2x/day   Recently had 1 arlet colored stool and 1 rope like stool   No abd pain, no N/V, no blood  No known GI FH    Collect stool studies to r/o underlying pathogenic/inflammatory cause of rope like stool, no warning signs. Do not suspect biliary issue related to arlet stool as he only had one episode, LFT's normal. If arlet colored stool returns, would consider abd US.     Plan:  Orders Placed This Encounter    Stool culture    Calprotectin, Stool    Giardia / Cryptosporidum, EIA    Rotavirus antigen, stool    Stool Exam-Ova,Cysts,Parasites    WBC, Stool    Ambulatory referral/consult to Internal Medicine     Check stool studies  Placed referral for PCP to establish care    Lindsey Ray, NP [facility]

## 2024-07-02 ENCOUNTER — LAB VISIT (OUTPATIENT)
Dept: LAB | Facility: HOSPITAL | Age: 39
End: 2024-07-02
Payer: COMMERCIAL

## 2024-07-02 DIAGNOSIS — R19.8 ABNORMAL BOWEL MOVEMENT: ICD-10-CM

## 2024-07-02 PROCEDURE — 87046 STOOL CULTR AEROBIC BACT EA: CPT

## 2024-07-02 PROCEDURE — 87427 SHIGA-LIKE TOXIN AG IA: CPT

## 2024-07-02 PROCEDURE — 87209 SMEAR COMPLEX STAIN: CPT

## 2024-07-02 PROCEDURE — 83993 ASSAY FOR CALPROTECTIN FECAL: CPT

## 2024-07-02 PROCEDURE — 89055 LEUKOCYTE ASSESSMENT FECAL: CPT

## 2024-07-02 PROCEDURE — 87425 ROTAVIRUS AG IA: CPT

## 2024-07-02 PROCEDURE — 87328 CRYPTOSPORIDIUM AG IA: CPT

## 2024-07-02 PROCEDURE — 87045 FECES CULTURE AEROBIC BACT: CPT

## 2024-07-02 PROCEDURE — 87449 NOS EACH ORGANISM AG IA: CPT

## 2024-07-03 LAB
CRYPTOSP AG STL QL IA: NEGATIVE
E COLI SXT1 STL QL IA: NEGATIVE
E COLI SXT2 STL QL IA: NEGATIVE
G LAMBLIA AG STL QL IA: NEGATIVE
RV AG STL QL IA.RAPID: NEGATIVE
WBC #/AREA STL HPF: NORMAL /[HPF]

## 2024-07-04 LAB — BACTERIA STL CULT: NORMAL

## 2024-07-06 LAB — O+P STL MICRO: NORMAL

## 2024-07-08 LAB — CALPROTECTIN STL-MCNT: 30.6 MCG/G

## 2024-10-23 ENCOUNTER — CLINICAL SUPPORT (OUTPATIENT)
Dept: OTHER | Facility: CLINIC | Age: 39
End: 2024-10-23

## 2024-10-23 DIAGNOSIS — Z00.8 ENCOUNTER FOR OTHER GENERAL EXAMINATION: ICD-10-CM

## 2024-10-24 VITALS — DIASTOLIC BLOOD PRESSURE: 81 MMHG | SYSTOLIC BLOOD PRESSURE: 140 MMHG

## 2024-10-24 LAB
GLUCOSE SERPL-MCNC: 114 MG/DL (ref 60–140)
HDLC SERPL-MCNC: 27 MG/DL
POC CHOLESTEROL, LDL (DOCK): 104 MG/DL
POC CHOLESTEROL, TOTAL: 209 MG/DL
TRIGL SERPL-MCNC: 454 MG/DL

## 2025-03-19 ENCOUNTER — HOSPITAL ENCOUNTER (OUTPATIENT)
Dept: RADIOLOGY | Facility: HOSPITAL | Age: 40
Discharge: HOME | End: 2025-03-19
Attending: INTERNAL MEDICINE
Payer: COMMERCIAL

## 2025-03-19 ENCOUNTER — OFFICE VISIT (OUTPATIENT)
Dept: INTERNAL MEDICINE | Facility: CLINIC | Age: 40
End: 2025-03-19
Payer: COMMERCIAL

## 2025-03-19 ENCOUNTER — RESULTS FOLLOW-UP (OUTPATIENT)
Dept: INTERNAL MEDICINE | Facility: CLINIC | Age: 40
End: 2025-03-19

## 2025-03-19 VITALS
DIASTOLIC BLOOD PRESSURE: 82 MMHG | SYSTOLIC BLOOD PRESSURE: 118 MMHG | OXYGEN SATURATION: 98 % | WEIGHT: 191.56 LBS | HEART RATE: 58 BPM | BODY MASS INDEX: 30.07 KG/M2 | HEIGHT: 67 IN

## 2025-03-19 DIAGNOSIS — R07.9 CHEST PAIN, UNSPECIFIED TYPE: Primary | ICD-10-CM

## 2025-03-19 DIAGNOSIS — R00.1 BRADYCARDIA: ICD-10-CM

## 2025-03-19 DIAGNOSIS — I78.1 SPIDER VEINS: ICD-10-CM

## 2025-03-19 DIAGNOSIS — R07.9 CHEST PAIN, UNSPECIFIED TYPE: ICD-10-CM

## 2025-03-19 LAB
OHS QRS DURATION: 110 MS
OHS QTC CALCULATION: 373 MS

## 2025-03-19 PROCEDURE — 99999 PR PBB SHADOW E&M-EST. PATIENT-LVL IV: CPT | Mod: PBBFAC,,, | Performed by: INTERNAL MEDICINE

## 2025-03-19 PROCEDURE — 93971 EXTREMITY STUDY: CPT | Mod: 26,RT,, | Performed by: RADIOLOGY

## 2025-03-19 PROCEDURE — 93971 EXTREMITY STUDY: CPT | Mod: TC,RT

## 2025-03-19 PROCEDURE — 71046 X-RAY EXAM CHEST 2 VIEWS: CPT | Mod: 26,,, | Performed by: RADIOLOGY

## 2025-03-19 PROCEDURE — 71046 X-RAY EXAM CHEST 2 VIEWS: CPT | Mod: TC

## 2025-03-19 PROCEDURE — 93010 ELECTROCARDIOGRAM REPORT: CPT | Mod: S$GLB,,, | Performed by: STUDENT IN AN ORGANIZED HEALTH CARE EDUCATION/TRAINING PROGRAM

## 2025-03-19 RX ORDER — INDOMETHACIN 50 MG/1
50 CAPSULE ORAL 2 TIMES DAILY WITH MEALS
Qty: 14 CAPSULE | Refills: 0 | Status: SHIPPED | OUTPATIENT
Start: 2025-03-19

## 2025-03-19 RX ORDER — ALBUTEROL SULFATE 90 UG/1
2 INHALANT RESPIRATORY (INHALATION) EVERY 6 HOURS PRN
Qty: 18 G | Refills: 1 | Status: SHIPPED | OUTPATIENT
Start: 2025-03-19 | End: 2026-03-19

## 2025-03-19 NOTE — PROGRESS NOTES
Subjective:        Chief Complaint   Patient presents with    Chest Pain        Patient ID: Nicanor Solis    HPI  Nicaonr Solis is a 39-year-old male presenting with a one-day history of left-sided chest pain. The pain began last night while lying down and worsens with deep inhalation. He describes it as non-radiating and does not report any associated pressure, squeezing sensation, or exertional component. The chest wall is non-tender on palpation. Pain improves with movement, but he is unsure if leaning forward provides relief. He reports four similar episodes over the past three years, though none were formally evaluated.  Three weeks ago, all four members of his household experienced symptoms of an upper respiratory viral infection. However, he denies fever, chills, cough, shortness of breath, or wheezing.  Incidental finding of asymptomatic bradycardia was noted, with an initial EKG showing a heart rate of 43 and a repeat EKG showing 53. Additionally, he has spider veins in the right lower extremity but denies pain, claudication, or edema. He is physically active, playing soccer weekly, and takes no regular medications. No other complaints at this time.  He reports his diet has not been as good as usual but denies abodminal pain or reflux     Chest Pain   Pertinent negatives include no abdominal pain, back pain, claudication, cough, diaphoresis, dizziness, fever, headaches, hemoptysis, malaise/fatigue, nausea, orthopnea, palpitations, shortness of breath, sputum production, vomiting or weakness.   Pertinent negatives for past medical history include no seizures.       Review of Systems   Constitutional:  Negative for chills, diaphoresis, fever, malaise/fatigue and weight loss.   HENT: Negative.     Eyes: Negative.    Respiratory:  Negative for cough, hemoptysis, sputum production, shortness of breath and wheezing.    Cardiovascular:  Positive for chest pain. Negative for palpitations, orthopnea,  "claudication and leg swelling.   Gastrointestinal:  Negative for abdominal pain, blood in stool, constipation, diarrhea, heartburn, melena, nausea and vomiting.   Genitourinary:  Negative for dysuria, flank pain, frequency, hematuria and urgency.   Musculoskeletal:  Negative for back pain, falls, joint pain, myalgias and neck pain.   Skin:  Negative for itching and rash.   Neurological:  Negative for dizziness, seizures, weakness and headaches.   Endo/Heme/Allergies:  Does not bruise/bleed easily.   Psychiatric/Behavioral: Negative.            The following sections were updated/reviewed and documented in the electronic medical record: Past Medical History, Past Surgical History, Social History, Family History, Allergies and Medications    Objective:     Physical Exam  Vitals:    03/19/25 0842   BP: 118/82   Pulse: (!) 58   SpO2: 98%   Weight: 86.9 kg (191 lb 9.3 oz)   Height: 5' 7" (1.702 m)    Body mass index is 30.01 kg/m².  Weight: 86.9 kg (191 lb 9.3 oz)   Height: 5' 7" (170.2 cm)     Physical Exam  Constitutional:       Appearance: Normal appearance. He is normal weight.   HENT:      Head: Normocephalic and atraumatic.      Nose: Nose normal.      Mouth/Throat:      Mouth: Mucous membranes are moist.   Eyes:      Pupils: Pupils are equal, round, and reactive to light.   Cardiovascular:      Rate and Rhythm: Regular rhythm. Bradycardia present.      Pulses: Normal pulses.      Heart sounds: Normal heart sounds.      Comments: Right lower limb spider veins  Pulmonary:      Effort: Pulmonary effort is normal. No respiratory distress.      Breath sounds: Normal breath sounds. No stridor. No wheezing, rhonchi or rales.      Comments: Slight decreased   Chest:      Chest wall: Tenderness present.   Abdominal:      General: Abdomen is flat. Bowel sounds are normal.      Palpations: Abdomen is soft.   Musculoskeletal:      Cervical back: Normal range of motion and neck supple.   Skin:     General: Skin is warm and " dry.   Neurological:      General: No focal deficit present.      Mental Status: He is alert and oriented to person, place, and time. Mental status is at baseline.   Psychiatric:         Mood and Affect: Mood normal.         Behavior: Behavior normal.         Thought Content: Thought content normal.           Assessment and Plan:     1. Chest pain, unspecified type    2. Spider veins    3. Bradycardia        Nicanor was seen today for chest pain.    Diagnoses and all orders for this visit:    Chest pain, unspecified type  Pleuritic discussed   -     IN OFFICE EKG 12-LEAD (to [facility])  -     CBC Auto Differential; Future  -     Comprehensive Metabolic Panel; Future  -     X-Ray Chest PA And Lateral; Future  -     Ambulatory referral/consult to Cardiology; Future    Spider veins  No swelling noted   Will proceed with   -     US Lower Extremity Veins Right; Future    Bradycardia  Discussed will refer to cardiology  Due to hr in 40s at times no syncope   -     Ambulatory referral/consult to Cardiology; Future    Other orders  -     albuterol (VENTOLIN HFA) 90 mcg/actuation inhaler; Inhale 2 puffs into the lungs every 6 (six) hours as needed for Shortness of Breath (pain). Rescue  -     indomethacin (INDOCIN) 50 MG capsule; Take 1 capsule (50 mg total) by mouth 2 (two) times daily with meals.    If develops sob or increased symtpoms concern he will seek attention/er evaluation if concerns         Health Maintenance reviewed, addressed as per orders    The patient expressed understanding and no barriers to adherence were identified.       [facility] MS-4    I hereby acknowledge that I am relying upon documentation authored by a medical student working under my supervision and further I hereby attest that I have verified the student documentation or findings by personally performing the physical exam and medical decision making activities of the Evaluation and Management service to be  billed.    [facility] - MS4    I hereby acknowledge that I am relying upon documentation authored by a medical student working under my supervision and further I hereby attest that I have verified the student documentation or findings by personally performing the physical exam and medical decision making activities of the Evaluation and Management service to be billed.  Anne Nguyen

## 2025-03-20 ENCOUNTER — OFFICE VISIT (OUTPATIENT)
Dept: CARDIOLOGY | Facility: CLINIC | Age: 40
End: 2025-03-20
Payer: COMMERCIAL

## 2025-03-20 VITALS
SYSTOLIC BLOOD PRESSURE: 118 MMHG | WEIGHT: 194 LBS | OXYGEN SATURATION: 98 % | HEIGHT: 67 IN | BODY MASS INDEX: 30.45 KG/M2 | HEART RATE: 52 BPM | DIASTOLIC BLOOD PRESSURE: 73 MMHG

## 2025-03-20 DIAGNOSIS — R07.9 CHEST PAIN, UNSPECIFIED TYPE: ICD-10-CM

## 2025-03-20 DIAGNOSIS — R00.1 BRADYCARDIA: ICD-10-CM

## 2025-03-20 DIAGNOSIS — E78.1 HYPERTRIGLYCERIDEMIA: ICD-10-CM

## 2025-03-20 DIAGNOSIS — E78.5 HYPERLIPIDEMIA, UNSPECIFIED HYPERLIPIDEMIA TYPE: Primary | ICD-10-CM

## 2025-03-20 DIAGNOSIS — R07.89 CHEST PAIN, MUSCULOSKELETAL: ICD-10-CM

## 2025-03-20 PROCEDURE — 3008F BODY MASS INDEX DOCD: CPT | Mod: CPTII,S$GLB,, | Performed by: INTERNAL MEDICINE

## 2025-03-20 PROCEDURE — 99203 OFFICE O/P NEW LOW 30 MIN: CPT | Mod: S$GLB,,, | Performed by: INTERNAL MEDICINE

## 2025-03-20 PROCEDURE — 99999 PR PBB SHADOW E&M-EST. PATIENT-LVL III: CPT | Mod: PBBFAC,,, | Performed by: INTERNAL MEDICINE

## 2025-03-20 PROCEDURE — 1159F MED LIST DOCD IN RCRD: CPT | Mod: CPTII,S$GLB,, | Performed by: INTERNAL MEDICINE

## 2025-03-20 PROCEDURE — 3078F DIAST BP <80 MM HG: CPT | Mod: CPTII,S$GLB,, | Performed by: INTERNAL MEDICINE

## 2025-03-20 PROCEDURE — 3074F SYST BP LT 130 MM HG: CPT | Mod: CPTII,S$GLB,, | Performed by: INTERNAL MEDICINE

## 2025-03-20 NOTE — ASSESSMENT & PLAN NOTE
He has noticed slow heart rates previously.  He plays soccer on a regular basis.  Condition stable he is asymptomatic.

## 2025-03-20 NOTE — ASSESSMENT & PLAN NOTE
A new problem by recent lab draw.  Likely it will returned to acceptable range on next blood draw.  I think it is diet intake related.

## 2025-03-20 NOTE — PROGRESS NOTES
[facility] Cardiology     Subjective:    Patient ID:  Nicanor Solis is a 39 y.o. male who presents for evaluation of Chest Pain and Hyperlipidemia    Review of patient's allergies indicates:   Allergen Reactions    Penicillins Other (See Comments)     Pt was testing at birth     He is kindly referred for evaluation of left-sided chest pain, pleuritic component.  It developed after playing a soccer game on the weekend.  He states he has had it in the past on rare occasion, 2 or 3 previous episodes through the years.  A chest x-ray was done which did not show any abnormalities.  He was given Indocin and pulmonary inhalers.  He is chest pain-free today.  He has taken 2 doses of Indocin prescribed yesterday.    He has no cardiac history.  It was fairly intense pain on deep breathing, reproducible.  He found that there were certain positions he could maneuver while lying down that would exacerbate the pain.  He was not short of breath from a pulmonary standpoint.    Labs were done.  His triglycerides were in the 400 range which is a new problem he has never had on previous cholesterol blood draws.  He is not a cigarette smoker.  He plays soccer on a regular basis.  His electrocardiogram yesterday showed sinus bradycardia in the 40s without ischemic ST changes or infarct pattern.    He does state that recently he has been eating a lot of cheese and processed foods.  It is not his usual diet.         Review of Systems   Constitutional: Negative for chills, decreased appetite, diaphoresis, fever, malaise/fatigue, night sweats, weight gain and weight loss.   HENT:  Negative for congestion, ear discharge, ear pain, hearing loss, hoarse voice, nosebleeds, odynophagia, sore throat, stridor and tinnitus.    Eyes:  Negative for blurred vision, discharge, double vision, pain, photophobia, redness, vision loss in left eye, vision loss in right eye, visual  disturbance and visual halos.   Cardiovascular:  Positive for chest pain. Negative for claudication, cyanosis, dyspnea on exertion, irregular heartbeat, leg swelling, near-syncope, orthopnea, palpitations, paroxysmal nocturnal dyspnea and syncope.   Respiratory:  Negative for cough, hemoptysis, shortness of breath, sleep disturbances due to breathing, snoring, sputum production and wheezing.    Endocrine: Negative for cold intolerance, heat intolerance, polydipsia, polyphagia and polyuria.   Hematologic/Lymphatic: Negative for adenopathy and bleeding problem. Does not bruise/bleed easily.   Skin:  Negative for color change, dry skin, flushing, itching, nail changes, poor wound healing, rash, skin cancer, suspicious lesions and unusual hair distribution.   Musculoskeletal:  Negative for arthritis, back pain, falls, gout, joint pain, joint swelling, muscle cramps, muscle weakness, myalgias, neck pain and stiffness.   Gastrointestinal:  Negative for bloating, abdominal pain, anorexia, change in bowel habit, bowel incontinence, constipation, diarrhea, dysphagia, excessive appetite, flatus, heartburn, hematemesis, hematochezia, hemorrhoids, jaundice, melena, nausea and vomiting.   Genitourinary:  Negative for bladder incontinence, decreased libido, dysuria, flank pain, frequency, genital sores, hematuria, hesitancy, incomplete emptying, nocturia and urgency.   Neurological:  Negative for aphonia, brief paralysis, difficulty with concentration, disturbances in coordination, excessive daytime sleepiness, dizziness, focal weakness, headaches, light-headedness, loss of balance, numbness, paresthesias, seizures, sensory change, tremors, vertigo and weakness.   Psychiatric/Behavioral:  Negative for altered mental status, depression, hallucinations, memory loss, substance abuse, suicidal ideas and thoughts of violence. The patient does not have insomnia and is not nervous/anxious.    Allergic/Immunologic: Negative for hives and  "persistent infections.        Objective:       Vitals:    03/20/25 1544   BP: 118/73   Patient Position: Sitting   Pulse: (!) 52   SpO2: 98%   Weight: 88 kg (194 lb 0.1 oz)   Height: 5' 7" (1.702 m)    Physical Exam  Constitutional:       General: He is not in acute distress.     Appearance: He is well-developed. He is not diaphoretic.   HENT:      Head: Normocephalic and atraumatic.      Nose: Nose normal.   Eyes:      General: No scleral icterus.        Right eye: No discharge.      Conjunctiva/sclera: Conjunctivae normal.      Pupils: Pupils are equal, round, and reactive to light.   Neck:      Thyroid: No thyromegaly.      Vascular: No JVD.      Trachea: No tracheal deviation.   Cardiovascular:      Rate and Rhythm: Normal rate and regular rhythm.      Pulses:           Carotid pulses are 2+ on the right side and 2+ on the left side.       Radial pulses are 2+ on the right side and 2+ on the left side.        Dorsalis pedis pulses are 2+ on the right side and 2+ on the left side.        Posterior tibial pulses are 2+ on the right side and 2+ on the left side.      Heart sounds: Normal heart sounds. No murmur heard.     No friction rub. No gallop.   Pulmonary:      Effort: Pulmonary effort is normal. No respiratory distress.      Breath sounds: Normal breath sounds. No stridor. No wheezing or rales.   Chest:      Chest wall: No tenderness.   Abdominal:      General: Bowel sounds are normal. There is no distension.      Palpations: Abdomen is soft. There is no mass.      Tenderness: There is no abdominal tenderness. There is no guarding or rebound.   Musculoskeletal:         General: No tenderness. Normal range of motion.      Cervical back: Normal range of motion and neck supple.   Lymphadenopathy:      Cervical: No cervical adenopathy.   Skin:     General: Skin is warm and dry.      Coloration: Skin is not pale.      Findings: No erythema or rash.   Neurological:      Mental Status: He is alert and oriented to " person, place, and time.      Cranial Nerves: No cranial nerve deficit.      Coordination: Coordination normal.   Psychiatric:         Behavior: Behavior normal.         Thought Content: Thought content normal.         Judgment: Judgment normal.           Assessment:       1. Hyperlipidemia, unspecified hyperlipidemia type    2. Chest pain, unspecified type    3. Bradycardia    4. Chest pain, musculoskeletal    5. Hypertriglyceridemia      Results for orders placed or performed in visit on 03/19/25   CBC Auto Differential    Collection Time: 03/19/25 10:14 AM   Result Value Ref Range    WBC 5.46 3.90 - 12.70 K/uL    RBC 4.74 4.60 - 6.20 M/uL    Hemoglobin 14.0 14.0 - 18.0 g/dL    Hematocrit 42.3 40.0 - 54.0 %    MCV 89 82 - 98 fL    MCH 29.5 27.0 - 31.0 pg    MCHC 33.1 32.0 - 36.0 g/dL    RDW 12.8 11.5 - 14.5 %    Platelets 183 150 - 450 K/uL    MPV 11.1 9.2 - 12.9 fL    Immature Granulocytes 0.2 0.0 - 0.5 %    Gran # (ANC) 2.7 1.8 - 7.7 K/uL    Immature Grans (Abs) 0.01 0.00 - 0.04 K/uL    Lymph # 2.1 1.0 - 4.8 K/uL    Mono # 0.4 0.3 - 1.0 K/uL    Eos # 0.2 0.0 - 0.5 K/uL    Baso # 0.04 0.00 - 0.20 K/uL    nRBC 0 0 /100 WBC    Gran % 48.7 38.0 - 73.0 %    Lymph % 38.8 18.0 - 48.0 %    Mono % 8.1 4.0 - 15.0 %    Eosinophil % 3.5 0.0 - 8.0 %    Basophil % 0.7 0.0 - 1.9 %    Differential Method Automated    Comprehensive Metabolic Panel    Collection Time: 03/19/25 10:14 AM   Result Value Ref Range    Sodium 138 136 - 145 mmol/L    Potassium 4.4 3.5 - 5.1 mmol/L    Chloride 104 95 - 110 mmol/L    CO2 25 23 - 29 mmol/L    Glucose 81 70 - 110 mg/dL    BUN 11 6 - 20 mg/dL    Creatinine 0.8 0.5 - 1.4 mg/dL    Calcium 9.1 8.7 - 10.5 mg/dL    Total Protein 7.4 6.0 - 8.4 g/dL    Albumin 4.3 3.5 - 5.2 g/dL    Total Bilirubin 0.5 0.1 - 1.0 mg/dL    Alkaline Phosphatase 55 40 - 150 U/L    AST 26 10 - 40 U/L    ALT 36 10 - 44 U/L    eGFR >60.0 >60 mL/min/1.73 m^2    Anion Gap 9 8 - 16 mmol/L       Current Medications[1]     Lab  Results   Component Value Date    WBC 5.46 03/19/2025    RBC 4.74 03/19/2025    HGB 14.0 03/19/2025    HCT 42.3 03/19/2025    MCV 89 03/19/2025    MCH 29.5 03/19/2025    MCHC 33.1 03/19/2025    RDW 12.8 03/19/2025     03/19/2025    MPV 11.1 03/19/2025    GRAN 2.7 03/19/2025    GRAN 48.7 03/19/2025    LYMPH 2.1 03/19/2025    LYMPH 38.8 03/19/2025    MONO 0.4 03/19/2025    MONO 8.1 03/19/2025    EOS 0.2 03/19/2025    BASO 0.04 03/19/2025    EOSINOPHIL 3.5 03/19/2025    BASOPHIL 0.7 03/19/2025    MG 2.0 06/27/2022        CMP  Lab Results   Component Value Date     03/19/2025    K 4.4 03/19/2025     03/19/2025    CO2 25 03/19/2025    GLU 81 03/19/2025    BUN 11 03/19/2025    CREATININE 0.8 03/19/2025    CALCIUM 9.1 03/19/2025    PROT 7.4 03/19/2025    ALBUMIN 4.3 03/19/2025    BILITOT 0.5 03/19/2025    ALKPHOS 55 03/19/2025    AST 26 03/19/2025    ALT 36 03/19/2025    ANIONGAP 9 03/19/2025    ESTGFRAFRICA >60 07/08/2022    EGFRNONAA >60 07/08/2022        Lab Results   Component Value Date    LABBLOO No growth after 5 days. 06/25/2022    LABBLOO No growth after 5 days. 06/25/2022            Results for orders placed or performed in visit on 03/19/25   IN OFFICE EKG 12-LEAD (to [facility]ta)    Collection Time: 03/19/25  9:43 AM   Result Value Ref Range    QRS Duration 110 ms    [facility] QTC Calculation 373 ms    Narrative    Test Reason : R07.9,    Vent. Rate :  43 BPM     Atrial Rate :  43 BPM     P-R Int : 146 ms          QRS Dur : 110 ms      QT Int : 442 ms       P-R-T Axes :  25 -19  15 degrees    QTcB Int : 373 ms    Marked sinus bradycardia  Moderate voltage criteria for LVH, may be normal variant  Abnormal ECG  When compared with ECG of 27-Jun-2022 02:15,  Premature atrial complexes are no longer Present  QT has shortened  Confirmed by Chapito Worrell ([id number]) on 3/19/2025 3:34:45 PM    Referred By: DR. BOGGS           Confirmed By: Chapito Worrell         Lower Extremity Veins Right 03/19/2025  [id number] Final   X-Ray Chest PA And Lateral 03/19/2025 [id number] Final   CT Abdomen Pelvis  Without Contrast 06/25/2022 [id number] Final             Plan:       Problem List Items Addressed This Visit          Cardiac/Vascular    Bradycardia    He has noticed slow heart rates previously.  He plays soccer on a regular basis.  Condition stable he is asymptomatic.         Hypertriglyceridemia    A new problem by recent lab draw.  Likely it will returned to acceptable range on next blood draw.  I think it is diet intake related.            Orthopedic    Chest pain, musculoskeletal    Given his response to Indocin and absence of chest pain, there is extremely low suspicion for pulmonary embolus.  He had pleuritic chest pain.    Likely it is related to overuse injury.    I suspect his symptoms will resolve.          Other Visit Diagnoses         Hyperlipidemia, unspecified hyperlipidemia type    -  Primary    Relevant Orders    Lipid Panel      Chest pain, unspecified type                   Reassurance provided.  I told him he could suspend innocent tomorrow but resume it if he is still having pain.  It is a musculoskeletal discomfort from my perspective.    No further cardiac workup advised.    He exhibits normal exercise tolerance.  Much discussion was carried out regarding the type of complaints he would have if there were cardiac issues.    I told him I would recommend another cholesterol blood draw in about a month.  Placed the order and I will address follow-up after he gets the lipid profile repeated.  I suspect his triglycerides will be back to normal.    Thank you for allowing me to participate in your patient's care.              Terrell Saxena MD  03/20/2025   4:33 PM               [1]   Current Outpatient Medications:     albuterol (VENTOLIN HFA) 90 mcg/actuation inhaler, Inhale 2 puffs into the lungs every 6 (six) hours as needed for Shortness of Breath (pain). Rescue, Disp: 18 g, Rfl: 1    indomethacin  (INDOCIN) 50 MG capsule, Take 1 capsule (50 mg total) by mouth 2 (two) times daily with meals., Disp: 14 capsule, Rfl: 0    cetirizine (ZYRTEC) 10 MG tablet, Take 1 tablet (10 mg total) by mouth once daily. (Patient not taking: Reported on 7/8/2022), Disp: 90 tablet, Rfl: 3

## 2025-03-20 NOTE — ASSESSMENT & PLAN NOTE
Given his response to Indocin and absence of chest pain, there is extremely low suspicion for pulmonary embolus.  He had pleuritic chest pain.    Likely it is related to overuse injury.    I suspect his symptoms will resolve.

## 2025-07-07 ENCOUNTER — OFFICE VISIT (OUTPATIENT)
Dept: INTERNAL MEDICINE | Facility: CLINIC | Age: 40
End: 2025-07-07
Payer: COMMERCIAL

## 2025-07-07 VITALS
WEIGHT: 190.69 LBS | BODY MASS INDEX: 28.9 KG/M2 | HEIGHT: 68 IN | HEART RATE: 67 BPM | OXYGEN SATURATION: 97 % | SYSTOLIC BLOOD PRESSURE: 130 MMHG | DIASTOLIC BLOOD PRESSURE: 82 MMHG

## 2025-07-07 DIAGNOSIS — Z00.00 ROUTINE GENERAL MEDICAL EXAMINATION AT A HEALTH CARE FACILITY: Primary | ICD-10-CM

## 2025-07-07 DIAGNOSIS — Z12.83 SKIN EXAM FOR MALIGNANT NEOPLASM: ICD-10-CM

## 2025-07-07 DIAGNOSIS — S42.92XD CLOSED FRACTURE OF LEFT SHOULDER WITH ROUTINE HEALING, SUBSEQUENT ENCOUNTER: ICD-10-CM

## 2025-07-07 PROCEDURE — 3008F BODY MASS INDEX DOCD: CPT | Mod: CPTII,S$GLB,, | Performed by: INTERNAL MEDICINE

## 2025-07-07 PROCEDURE — 99999 PR PBB SHADOW E&M-EST. PATIENT-LVL IV: CPT | Mod: PBBFAC,,, | Performed by: INTERNAL MEDICINE

## 2025-07-07 PROCEDURE — 3075F SYST BP GE 130 - 139MM HG: CPT | Mod: CPTII,S$GLB,, | Performed by: INTERNAL MEDICINE

## 2025-07-07 PROCEDURE — 99396 PREV VISIT EST AGE 40-64: CPT | Mod: S$GLB,,, | Performed by: INTERNAL MEDICINE

## 2025-07-07 PROCEDURE — 1160F RVW MEDS BY RX/DR IN RCRD: CPT | Mod: CPTII,S$GLB,, | Performed by: INTERNAL MEDICINE

## 2025-07-07 PROCEDURE — 1159F MED LIST DOCD IN RCRD: CPT | Mod: CPTII,S$GLB,, | Performed by: INTERNAL MEDICINE

## 2025-07-07 PROCEDURE — 3079F DIAST BP 80-89 MM HG: CPT | Mod: CPTII,S$GLB,, | Performed by: INTERNAL MEDICINE

## 2025-07-07 NOTE — PROGRESS NOTES
Subjective     Patient ID: Nicanor Solis is a 39 y.o. male.    Chief Complaint: Establish Care             History of Present Illness    CHIEF COMPLAINT:  Nicanor presents today to establish care.    MUSCULOSKELETAL:  He reports a shoulder injury from skateboarding at age 13-14 that never healed properly, impacting his range of motion. He was evaluated by Dr. Stewart in March 2015 and briefly attended recommended physical therapy. The limited shoulder mobility affects his ability to surf and perform athletic activities. He expresses interest in interventions to improve shoulder mobility for surfing and swimming activities.    WEIGHT MANAGEMENT:  He currently weighs 190 lbs, up from his previous weight of 180 lbs. He notes weight fluctuations correlate with activity levels. His goal weight range is 150-160 lbs. He has been tracking calories using a fitness diego for approximately one week. He reports difficulty losing weight and recognizes past tendency to underestimate caloric intake, noting that increased exercise historically leads to increased appetite. His long-term goal is to maintain a stable weight within a five-pound range.    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 He recently experienced chest pain, diagnosed as probable intercostal pain after normal workup. He currently denies chest pain.    ALLERGIES:  He reports a penicillin allergy since birth, identified during early surgical interventions, though current allergy status is uncertain.    SOCIAL HISTORY:  He reports minimal alcohol use with baseline sobriety, occasional increased consumption during vacations. Most binge drinking occurred in the past.    LABS:  He has a history of elevated cholesterol and triglycerides within the past year.    MEDICATIONS:  He currently takes no prescription medications.      ROS:  Constitutional: negative activity change, negative unexpected weight change  HENT:  negative trouble swallowing  Eyes: negative discharge, negative visual disturbance  Respiratory: negative chest tightness, negative wheezing, negative shortness of breath  Cardiovascular: negative chest pain, negative palpitations  Gastrointestinal: negative blood in stool, negative constipation, negative diarrhea, negative vomiting  Endocrine: negative polydipsia, negative polyuria  Genitourinary: negative difficulty urinating, negative dysuria, negative hematuria  Musculoskeletal: negative arthralgias, negative joint swelling, negative neck pain, +limited movement  Neurological: negative weakness, negative headaches  Psychiatric/Behavioral: negative confusion, negative dysphoric mood       As teenager broke L shoulder and limited rom since.          HPI  Review of Systems     Objective     Physical Exam  Vitals reviewed.   Constitutional:       General: He is not in acute distress.     Appearance: Normal appearance. He is well-developed. He is not ill-appearing, toxic-appearing or diaphoretic.   HENT:      Head: Normocephalic and atraumatic.   Eyes:      General: No scleral icterus.  Neck:      Thyroid: No thyromegaly.   Cardiovascular:      Rate and Rhythm: Normal rate and regular rhythm.      Heart sounds: Normal heart sounds. No murmur heard.     No friction rub. No gallop.   Pulmonary:      Effort: Pulmonary effort is normal. No respiratory distress.      Breath sounds: Normal breath sounds. No wheezing or rales.   Abdominal:      General: Bowel sounds are normal. There is no distension.      Palpations: Abdomen is soft. There is no mass.      Tenderness: There is no abdominal tenderness. There is no guarding or rebound.   Musculoskeletal:         General: Normal range of motion.      Cervical back: Normal range of motion.      Comments: Decreased L shoulder rom especially to internal rotation   Lymphadenopathy:      Cervical: No cervical adenopathy.   Skin:     Findings: No lesion.   Neurological:      Mental  Status: He is alert and oriented to person, place, and time.   Psychiatric:         Mood and Affect: Mood normal.         Behavior: Behavior normal.         Thought Content: Thought content normal.            Assessment and Plan     1. Routine general medical examination at a health care facility  -     Lipid Panel; Future; Expected date: 07/07/2025    2. Closed fracture of left shoulder with routine healing, subsequent encounter  -     Ambulatory referral/consult to Orthopedics; Future; Expected date: 07/14/2025    3. Skin exam for malignant neoplasm  -     Ambulatory referral/consult to Dermatology; Future; Expected date: 07/14/2025        Assessment & Plan    RIGHT SHOULDER PAIN AND STIFFNESS:  - Monitored patient's long-term right shoulder pain since skateboarding accident at age 13-14.  - Nicanor has limited range of motion due to a nodule on the humeral head.  - Nicanor desires a second opinion regarding shoulder surgery, as previous provider advised against it due to proximity to a vein.  - Referred to orthopedist for reassessment of injury and updated recommendations regarding surgical options.    HYPERLIPIDEMIA:  - Monitored patient's elevated cholesterol and triglycerides from less than a year ago.  - Reviewed cardiovascular health, noting previous chest pain episode that was cleared with chest XR.  - Ordered fasting lipid panel to reassess lipid levels.  - Advised patient to return for this fasting blood test.    OVERWEIGHT:  - Nicanor currently weighs 190 lbs with history of weight fluctuation.  - Nicanor is tracking calories and aims to reduce weight to 150-160 lbs for practical reasons related to surfing.  - Discussed sustainable weight loss strategies, setting realistic target of approximately 1 pound per week (10 lbs in 3-6 months).  - Advised against overly ambitious short-term goals to prevent yo-yo dieting.  - Recommend focusing on calorie reduction rather than increased exercise and encouraged  continued honest use of [employer] diego for tracking.    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    PENICILLIN ALLERGY:  - Documented patient's known allergy to penicillin identified early in life.    GENERAL HEALTH MANAGEMENT AND FOLLOW-UP:  - Conducted initial evaluation for establishing care, focusing on long-term health goals and current concerns.  - Referred to dermatologist for skin cancer screening due to history of sun exposure.  - Follow up in 1 year for annual follow-up unless new issues arise.  - Nicanor advised to contact office if any health concerns develop before next scheduled appointment.                    Health Maintenance         Date Due Completion Date    COVID-19 Vaccine (4 - 2024-25 season) 09/01/2024 12/21/2021    Influenza Vaccine (1) 09/01/2025 10/20/2021 (Done)    Override on 10/20/2021: Done    Hemoglobin A1c (Diabetic Prevention Screening) 05/30/2027 5/30/2024    Lipid Panel 10/23/2029 10/23/2024    TETANUS VACCINE 05/13/2031 5/13/2021    RSV Vaccine (Age 60+ and Pregnant patients) (1 - 1-dose 75+ series) 11/08/2059 ---          Follow up in about 1 year (around 7/7/2026)    Fasting lipid test please.    Visit today included increased complexity associated with the care of the episodic problem  addressed and managing the longitudinal care of the patient due to the serious and/or complex managed problem(s) .    Future Appointments   Date Time Provider Department Center   7/8/2025  8:00 AM Addison Arguello MD [facility] DERM [facility]   7/14/2025  7:00 AM [facility]   8/7/2025  8:30 AM Sohail Plata Jr., MD [facility]         This note was generated with the assistance of ambient listening technology. Verbal consent was obtained by the patient and accompanying visitor(s) for the recording of patient appointment to facilitate this note. I  attest to having reviewed and edited the generated note for accuracy, though some syntax or spelling errors may persist. Please contact the author of this note for any clarification.

## 2025-07-08 ENCOUNTER — OFFICE VISIT (OUTPATIENT)
Dept: DERMATOLOGY | Facility: CLINIC | Age: 40
End: 2025-07-08
Payer: COMMERCIAL

## 2025-07-08 DIAGNOSIS — L81.4 LENTIGO: ICD-10-CM

## 2025-07-08 DIAGNOSIS — D23.9 DERMATOFIBROMA: ICD-10-CM

## 2025-07-08 DIAGNOSIS — D22.9 MULTIPLE BENIGN NEVI: ICD-10-CM

## 2025-07-08 DIAGNOSIS — Z12.83 SCREENING EXAM FOR SKIN CANCER: ICD-10-CM

## 2025-07-08 DIAGNOSIS — L29.0 PRURITUS ANI: ICD-10-CM

## 2025-07-08 DIAGNOSIS — B07.9 VERRUCA VULGARIS: Primary | ICD-10-CM

## 2025-07-08 PROCEDURE — 99999 PR PBB SHADOW E&M-EST. PATIENT-LVL III: CPT | Mod: PBBFAC,,, | Performed by: STUDENT IN AN ORGANIZED HEALTH CARE EDUCATION/TRAINING PROGRAM

## 2025-07-08 PROCEDURE — 1159F MED LIST DOCD IN RCRD: CPT | Mod: CPTII,S$GLB,, | Performed by: STUDENT IN AN ORGANIZED HEALTH CARE EDUCATION/TRAINING PROGRAM

## 2025-07-08 PROCEDURE — 17110 DESTRUCTION B9 LES UP TO 14: CPT | Mod: S$GLB,,, | Performed by: STUDENT IN AN ORGANIZED HEALTH CARE EDUCATION/TRAINING PROGRAM

## 2025-07-08 PROCEDURE — 1160F RVW MEDS BY RX/DR IN RCRD: CPT | Mod: CPTII,S$GLB,, | Performed by: STUDENT IN AN ORGANIZED HEALTH CARE EDUCATION/TRAINING PROGRAM

## 2025-07-08 PROCEDURE — 99204 OFFICE O/P NEW MOD 45 MIN: CPT | Mod: 25,S$GLB,, | Performed by: STUDENT IN AN ORGANIZED HEALTH CARE EDUCATION/TRAINING PROGRAM

## 2025-07-08 RX ORDER — HYDROCORTISONE 25 MG/G
OINTMENT TOPICAL 2 TIMES DAILY
Qty: 28.35 G | Refills: 6 | Status: SHIPPED | OUTPATIENT
Start: 2025-07-08

## 2025-07-08 RX ORDER — TACROLIMUS 1 MG/G
OINTMENT TOPICAL 2 TIMES DAILY
Qty: 30 G | Refills: 10 | Status: SHIPPED | OUTPATIENT
Start: 2025-07-08

## 2025-07-08 NOTE — PATIENT INSTRUCTIONS
Warts        Nighttime regimen:   Soak finger for 5 minutes in lukewarm water    Either:  Apply salicylic acid 40% solution to affected area (Wart Stick or other prescribed medication). If on the hands or feet, cover with duct tape (3M)   Or:  Curad Mediplast 40% salicylic acid pads    Bandaids are not good because air still gets in   Covering warts helps soften them which helps the medication work better     Daytime regimen:   Remove duct tape   Pare down with a piece of sandpaper that can be thrown away.   DO NOT use stone/file/same paper twice or on any other area of body because this can spread the infection     Expectations:   Warts take a long time to treat    Some of the side effects of in office treatment, such as freezing, include redness and blistering; this is normal   Virus can live in the skin for long periods of time, even if no visible bump remains    Remember warts are contagious    MUST BE CONSISTENT WITH TREATMENT FOR BEST RESULTS     __________________    CRYOSURGERY      Your doctor has used a method called cryosurgery to treat your skin condition. Cryosurgery refers to the use of very cold substances to treat a variety of skin conditions such as warts, pre-skin cancers, molluscum contagiosum, sun spots, and several benign growths. The substance we use in cryosurgery is liquid nitrogen and is so cold (-195 degrees Celsius) that is burns when administered.     Following treatment in the office, the skin may immediately burn and become red. You may find the area around the lesion is affected as well. It is sometimes necessary to treat not only the lesion, but a small area of the surrounding normal skin to achieve a good response.     A blister, and even a blood filled blister, may form after treatment.   This is a normal response. If the blister is painful, it is acceptable to sterilize a needle and with rubbing alcohol and gently pop the blister. It is important that you gently wash the area with  soap and warm water as the blister fluid may contain wart virus if a wart was treated. Do no remove the roof of the blister.     The area treated can take anywhere from 1-3 weeks to heal. Healing time depends on the kind skin lesion treated, the location, and how aggressively the lesion was treated. It is recommended that the areas treated are covered with Vaseline or bacitracin ointment and a band-aid. If a band-aid is not practical, just ointment applied several times per day will do. Keeping these areas moist will speed the healing time.    Treatment with liquid nitrogen can leave a scar. In dark skin, it may be a light or dark scar, in light skin it may be a white or pink scar. These will generally fade with time, but may never go away completely.     If you have any concerns after your treatment, please feel free to call the office.       [address] [place] [address]/ [phone] FAX/ [email or web address]

## 2025-07-08 NOTE — PROGRESS NOTES
Subjective:      Patient ID:  Nicanor Solis is a 39 y.o. male who presents for No chief complaint on file.    Pt here for TBSE     Pt denies personal h/o skin cancer but has h/o mole removal and extensive sun exposure     Pt concerned about lesions on back- frequent sunburns.           Review of Systems    Objective:   Physical Exam   Constitutional: He appears well-developed and well-nourished. No distress.   Neurological: He is alert and oriented to person, place, and time. He is not disoriented.   Psychiatric: He has a normal mood and affect.   Skin:   Areas Examined (abnormalities noted in diagram):   Scalp / Hair Palpated and Inspected  Head / Face Inspection Performed  Neck Inspection Performed  Chest / Axilla Inspection Performed  Abdomen Inspection Performed  Genitals / Buttocks / Groin Inspection Performed  Back Inspection Performed  RUE Inspected  LUE Inspection Performed  RLE Inspected  LLE Inspection Performed  Nails and Digits Inspection Performed                     Diagram Legend     Erythematous scaling macule/papule c/w actinic keratosis       Vascular papule c/w angioma      Pigmented verrucoid papule/plaque c/w seborrheic keratosis      Yellow umbilicated papule c/w sebaceous hyperplasia      Irregularly shaped tan macule c/w lentigo     1-2 mm smooth white papules consistent with Milia      Movable subcutaneous cyst with punctum c/w epidermal inclusion cyst      Subcutaneous movable cyst c/w pilar cyst      Firm pink to brown papule c/w dermatofibroma      Pedunculated fleshy papule(s) c/w skin tag(s)      Evenly pigmented macule c/w junctional nevus     Mildly variegated pigmented, slightly irregular-bordered macule c/w mildly atypical nevus      Flesh colored to evenly pigmented papule c/w intradermal nevus       Pink pearly papule/plaque c/w basal cell carcinoma      Erythematous hyperkeratotic cursted plaque c/w SCC      Surgical scar with no sign of skin cancer recurrence      Open  and closed comedones      Inflammatory papules and pustules      Verrucoid papule consistent consistent with wart     Erythematous eczematous patches and plaques     Dystrophic onycholytic nail with subungual debris c/w onychomycosis     Umbilicated papule    Erythematous-base heme-crusted tan verrucoid plaque consistent with inflamed seborrheic keratosis     Erythematous Silvery Scaling Plaque c/w Psoriasis     See annotation      Assessment / Plan:        Verruca vulgaris  Cryosurgery procedure note:    Verbal consent from the patient is obtained including, but not limited to, risk of hypopigmentation/hyperpigmentation, scar, recurrence of lesion. Liquid nitrogen cryosurgery is applied to 1 lesions to produce a freeze injury. The patient is aware that blisters may form and is instructed on wound care with gentle cleansing and use of vaseline ointment to keep moist until healed. The patient is supplied a handout on cryosurgery and is instructed to call if lesions do not completely resolve.    Multiple benign nevi  Dermatofibroma  Lentigo  Reassurance given to patient. No treatment is necessary.   Treatment of benign, asymptomatic lesions may be considered cosmetic.    Screening exam for skin cancer  Total body skin examination performed today including at least 12 points as noted in physical examination. No lesions suspicious for malignancy noted.    Recommend daily sun protection/avoidance, use of at least SPF 30, broad spectrum sunscreen (OTC drug), skin self examinations, and routine physician surveillance to optimize early detection    Pruritus ani  -     hydrocortisone 2.5 % ointment; Apply topically 2 (two) times daily. Use to affected areas for up to 2 weeks then take a 1 week break or decrease to 3 times weekly. Use to buttock  Dispense: 28.35 g; Refill: 6  -     tacrolimus (PROTOPIC) 0.1 % ointment; Apply topically 2 (two) times daily. Apply to buttocks. Non-steroid. Can use continously  Dispense: 30 g;  Refill: 10    - stop all topicals  - keep area clean and dry  - vaseline only bid as barrier  - can trial HC and protopic         Follow up if symptoms worsen or fail to improve.

## 2025-07-14 ENCOUNTER — LAB VISIT (OUTPATIENT)
Dept: LAB | Facility: HOSPITAL | Age: 40
End: 2025-07-14
Attending: INTERNAL MEDICINE
Payer: COMMERCIAL

## 2025-07-14 DIAGNOSIS — Z00.00 ROUTINE GENERAL MEDICAL EXAMINATION AT A HEALTH CARE FACILITY: ICD-10-CM

## 2025-07-14 LAB
CHOLEST SERPL-MCNC: 157 MG/DL (ref 120–199)
CHOLEST/HDLC SERPL: 4.8 {RATIO} (ref 2–5)
HDLC SERPL-MCNC: 33 MG/DL (ref 40–75)
HDLC SERPL: 21 % (ref 20–50)
LDLC SERPL CALC-MCNC: 104.8 MG/DL (ref 63–159)
NONHDLC SERPL-MCNC: 124 MG/DL
TRIGL SERPL-MCNC: 96 MG/DL (ref 30–150)

## 2025-07-14 PROCEDURE — 80061 LIPID PANEL: CPT

## 2025-07-14 PROCEDURE — 36415 COLL VENOUS BLD VENIPUNCTURE: CPT

## 2025-08-06 ENCOUNTER — TELEPHONE (OUTPATIENT)
Dept: ORTHOPEDICS | Facility: CLINIC | Age: 40
End: 2025-08-06
Payer: COMMERCIAL

## 2025-08-06 DIAGNOSIS — M25.512 LEFT SHOULDER PAIN, UNSPECIFIED CHRONICITY: Primary | ICD-10-CM

## 2025-08-07 ENCOUNTER — HOSPITAL ENCOUNTER (OUTPATIENT)
Facility: HOSPITAL | Age: 40
Discharge: HOME | End: 2025-08-07
Attending: ORTHOPAEDIC SURGERY
Payer: COMMERCIAL

## 2025-08-07 ENCOUNTER — OFFICE VISIT (OUTPATIENT)
Dept: ORTHOPEDICS | Facility: CLINIC | Age: 40
End: 2025-08-07
Payer: COMMERCIAL

## 2025-08-07 VITALS — BODY MASS INDEX: 29 KG/M2 | HEIGHT: 68 IN

## 2025-08-07 DIAGNOSIS — M25.512 LEFT SHOULDER PAIN, UNSPECIFIED CHRONICITY: ICD-10-CM

## 2025-08-07 DIAGNOSIS — S42.92XD CLOSED FRACTURE OF LEFT SHOULDER WITH ROUTINE HEALING, SUBSEQUENT ENCOUNTER: ICD-10-CM

## 2025-08-07 PROCEDURE — 1159F MED LIST DOCD IN RCRD: CPT | Mod: CPTII,S$GLB,, | Performed by: ORTHOPAEDIC SURGERY

## 2025-08-07 PROCEDURE — 3008F BODY MASS INDEX DOCD: CPT | Mod: CPTII,S$GLB,, | Performed by: ORTHOPAEDIC SURGERY

## 2025-08-07 PROCEDURE — 99203 OFFICE O/P NEW LOW 30 MIN: CPT | Mod: S$GLB,,, | Performed by: ORTHOPAEDIC SURGERY

## 2025-08-07 PROCEDURE — 73030 X-RAY EXAM OF SHOULDER: CPT | Mod: TC,PN,LT

## 2025-08-07 PROCEDURE — 99999 PR PBB SHADOW E&M-EST. PATIENT-LVL III: CPT | Mod: PBBFAC,,, | Performed by: ORTHOPAEDIC SURGERY

## 2025-08-07 PROCEDURE — 73030 X-RAY EXAM OF SHOULDER: CPT | Mod: 26,LT,, | Performed by: RADIOLOGY

## 2025-08-07 NOTE — PROGRESS NOTES
"Subjective:      Patient ID: Nicanor Solis is a 39 y.o. male.    Chief Complaint: Consult (Left shoulder injury)      HPI  Nicanor Solis is a  39 y.o. male presenting today for left shoulder symptoms including stiffness related to an old injury.  There was a history of trauma.  Onset of symptoms began when he was a teenager sustained injury to his left shoulder which may have been a fracture sort of nontreated and subsequently developed an exostosis about the left surgical neck of the humerus   He did see Dr. Stewart a few years ago for loss of motion evaluation in the shoulder   PT was ordered but he really did not improve much   .      Review of patient's allergies indicates:   Allergen Reactions    Penicillins Other (See Comments)     Pt was testing at birth         Current Medications[1]    Past Medical History:   Diagnosis Date    ADHD (attention deficit hyperactivity disorder)        Past Surgical History:   Procedure Laterality Date    ABDOMINAL HERNIA REPAIR      TRACHEOESOPHAGEAL FISTULA CLOSURE      as child       Review of Systems:  ROS    OBJECTIVE:     PHYSICAL EXAM:  Height: 5' 8" (172.7 cm)    Vitals:    08/07/25 0849   Height: 5' 8" (1.727 m)   PainSc: 0-No pain   PainLoc: Shoulder     Well developed, well nourished male in no acute distress  Alert and oriented x 3  HEENT- Normal exam  Lungs- Clear to auscultation  Heart- Regular rate and rhythm  Abdomen- Soft nontender  Extremity exam- examination left shoulder no bony prominences palpated no swelling no bruising   Range of motion is full except for loss of internal rotation with the arm abducted he only has about 10 or 15° of internal rotation he is able to internally rotate with the arm at the side not as far as the other side   He does have some pain with this   No instability   Rotator cuff strength intact   Neurologic exam intact    RADIOGRAPHS:  AP lateral x-ray left shoulder shows a large exostosis arising from the inferior " aspect of the surgical neck it maybe bipartite it seems like it could be causing some impingement with abduction of the shoulder but it is unclear if internal rotation would be restricted based on the x-ray  Comments: I have personally reviewed the imaging and I agree with the above radiologist's report.    ASSESSMENT/PLAN:     IMPRESSION:  Exostosis left shoulder surgical neck chronic    PLAN:  The patient is wondering if removing the bone spur would improve his motion.  I explained to the patient that after 30 years of having this amount of internal rotation I really do not think removing the exostosis would improve things.  However it certainly could be removed and we can follow up with physical therapy and see how he does   Of course if it fails to improve his motion I would not want him to be disappointed  However it is possible that motion would improve  The patient would like to think this over and let us know if he wants to schedule surgery       - We talked at length about the anatomy and pathophysiology of   Encounter Diagnosis   Name Primary?    Closed fracture of left shoulder with routine healing, subsequent encounter            Disclaimer: This note has been generated using voice-recognition software. There may be typographical errors that have been missed during proof-reading.          [1]   Current Outpatient Medications   Medication Sig Dispense Refill    hydrocortisone 2.5 % ointment Apply topically 2 (two) times daily. Use to affected areas for up to 2 weeks then take a 1 week break or decrease to 3 times weekly. Use to buttock 28.35 g 6    tacrolimus (PROTOPIC) 0.1 % ointment Apply topically 2 (two) times daily. Apply to buttocks. Non-steroid. Can use continously 30 g 10     No current facility-administered medications for this visit.